# Patient Record
Sex: FEMALE | Race: WHITE | NOT HISPANIC OR LATINO | URBAN - METROPOLITAN AREA
[De-identification: names, ages, dates, MRNs, and addresses within clinical notes are randomized per-mention and may not be internally consistent; named-entity substitution may affect disease eponyms.]

---

## 2019-12-02 ENCOUNTER — FOLLOW UP (OUTPATIENT)
Dept: URBAN - METROPOLITAN AREA CLINIC 79 | Facility: CLINIC | Age: 69
End: 2019-12-02

## 2019-12-02 DIAGNOSIS — H40.019: ICD-10-CM

## 2019-12-02 PROCEDURE — 92012 INTRM OPH EXAM EST PATIENT: CPT

## 2019-12-02 PROCEDURE — 92133 CPTRZD OPH DX IMG PST SGM ON: CPT

## 2019-12-02 ASSESSMENT — VISUAL ACUITY
OD_CC: 20/25
OS_CC: 20/30

## 2019-12-02 ASSESSMENT — TONOMETRY: OD_IOP_MMHG: 12

## 2020-09-02 ENCOUNTER — ESTABLISHED COMPREHENSIVE EXAM (OUTPATIENT)
Dept: URBAN - METROPOLITAN AREA CLINIC 79 | Facility: CLINIC | Age: 70
End: 2020-09-02

## 2020-09-02 VITALS — HEIGHT: 55 IN

## 2020-09-02 DIAGNOSIS — H40.013: ICD-10-CM

## 2020-09-02 DIAGNOSIS — H52.12: ICD-10-CM

## 2020-09-02 PROCEDURE — 92250 FUNDUS PHOTOGRAPHY W/I&R: CPT

## 2020-09-02 PROCEDURE — 92014 COMPRE OPH EXAM EST PT 1/>: CPT

## 2020-09-02 PROCEDURE — 92015 DETERMINE REFRACTIVE STATE: CPT

## 2020-09-02 ASSESSMENT — VISUAL ACUITY
OS_CC: J1+
OD_CC: J1
OD_SC: 20/40
OS_CC: 20/20
OS_SC: 20/30-2
OD_CC: 20/20

## 2020-09-02 ASSESSMENT — TONOMETRY
OS_IOP_MMHG: 12
OD_IOP_MMHG: 14

## 2021-03-19 ENCOUNTER — IOP CHECK (OUTPATIENT)
Dept: URBAN - METROPOLITAN AREA CLINIC 79 | Facility: CLINIC | Age: 71
End: 2021-03-19

## 2021-03-19 VITALS — HEIGHT: 55 IN

## 2021-03-19 DIAGNOSIS — H40.013: ICD-10-CM

## 2021-03-19 PROCEDURE — 92083 EXTENDED VISUAL FIELD XM: CPT

## 2021-03-19 PROCEDURE — 92012 INTRM OPH EXAM EST PATIENT: CPT

## 2021-03-19 ASSESSMENT — TONOMETRY
OD_IOP_MMHG: 13
OS_IOP_MMHG: 14

## 2021-03-19 ASSESSMENT — VISUAL ACUITY
OS_CC: 20/25+2
OD_CC: 20/25+1

## 2021-10-19 ENCOUNTER — ESTABLISHED COMPREHENSIVE EXAM (OUTPATIENT)
Dept: URBAN - METROPOLITAN AREA CLINIC 79 | Facility: CLINIC | Age: 71
End: 2021-10-19

## 2021-10-19 DIAGNOSIS — H40.013: ICD-10-CM

## 2021-10-19 DIAGNOSIS — H35.373: ICD-10-CM

## 2021-10-19 DIAGNOSIS — Z96.1: ICD-10-CM

## 2021-10-19 DIAGNOSIS — H52.12: ICD-10-CM

## 2021-10-19 PROCEDURE — 92015 DETERMINE REFRACTIVE STATE: CPT

## 2021-10-19 PROCEDURE — 92134 CPTRZ OPH DX IMG PST SGM RTA: CPT | Mod: NC

## 2021-10-19 PROCEDURE — 92014 COMPRE OPH EXAM EST PT 1/>: CPT

## 2021-10-19 PROCEDURE — 92133 CPTRZD OPH DX IMG PST SGM ON: CPT

## 2021-10-19 ASSESSMENT — VISUAL ACUITY
OD_CC: 20/30+1
OS_CC: 20/25
OS_SC: 20/70
OD_CC: 20/25
OS_CC: 20/25
OD_SC: 20/50-1

## 2021-10-19 ASSESSMENT — TONOMETRY
OS_IOP_MMHG: 12
OD_IOP_MMHG: 13

## 2022-03-29 ENCOUNTER — IOP CHECK (OUTPATIENT)
Dept: URBAN - METROPOLITAN AREA CLINIC 79 | Facility: CLINIC | Age: 72
End: 2022-03-29

## 2022-03-29 DIAGNOSIS — Z96.1: ICD-10-CM

## 2022-03-29 DIAGNOSIS — H35.373: ICD-10-CM

## 2022-03-29 DIAGNOSIS — H40.013: ICD-10-CM

## 2022-03-29 PROCEDURE — 92134 CPTRZ OPH DX IMG PST SGM RTA: CPT

## 2022-03-29 PROCEDURE — 99213 OFFICE O/P EST LOW 20 MIN: CPT

## 2022-03-29 PROCEDURE — 92083 EXTENDED VISUAL FIELD XM: CPT

## 2022-03-29 ASSESSMENT — TONOMETRY
OS_IOP_MMHG: 10
OD_IOP_MMHG: 09

## 2022-03-29 ASSESSMENT — VISUAL ACUITY
OS_CC: 20/20
OD_CC: 20/25-1

## 2023-11-30 ENCOUNTER — HOSPITAL ENCOUNTER (OUTPATIENT)
Facility: HOSPITAL | Age: 73
Setting detail: RECURRING SERIES
End: 2023-11-30
Payer: MEDICARE

## 2023-11-30 PROCEDURE — 97110 THERAPEUTIC EXERCISES: CPT

## 2023-11-30 PROCEDURE — 97162 PT EVAL MOD COMPLEX 30 MIN: CPT

## 2023-11-30 NOTE — PROGRESS NOTES
PHYSICAL / OCCUPATIONAL THERAPY - DAILY TREATMENT NOTE (updated )    Patient Name: Demetri Daly    Date: 2023    : 1950  Insurance: Payor: Zara Senior / Plan: Jarrell Charm PPO / Product Type: Medicare /      Patient  verified Yes     Visit #   Current / Total 1 16   Time   In / Out 1:42 2:25   Pain   In / Out 7/10 7/10   Subjective Functional Status/Changes: See Eval/POC. TREATMENT AREA =  Pain in left knee [M25.562]    OBJECTIVE    33 min [x]Eval - untimed                      Therapeutic Procedures: Tx Min Billable or 1:1 Min (if diff from Tx Min) Procedure, Rationale, Specifics   10 10 [id number] Therapeutic Exercise (timed):  increase ROM, strength, coordination, balance, and proprioception to improve patient's ability to progress to PLOF and address remaining functional goals. (see flow sheet as applicable)     Details if applicable:  Patient instructed in and briefly performed beginning HEP. Handouts issued with pictures and written directions for HEP; copies placed in chart. Details if applicable:            Details if applicable:            Details if applicable:            Details if applicable:     10 10 [facility] Totals Reminder: bill using total billable min of TIMED therapeutic procedures (example: do not include dry needle or estim unattended, both untimed codes, in totals to left)  8-22 min = 1 unit; 23-37 min = 2 units; 38-52 min = 3 units; 53-67 min = 4 units; 68-82 min = 5 units   Total Total     [x]  Patient Education billed concurrently with other procedures   [x] Review HEP    [] Progressed/Changed HEP, detail:    [] Other detail:       Objective Information/Functional Measures/Assessment    See Eval/POC.     Patient will continue to benefit from skilled PT / OT services to modify and progress therapeutic interventions, analyze and address functional mobility deficits, analyze and address ROM deficits, analyze and address strength deficits, analyze

## 2023-11-30 NOTE — THERAPY EVALUATION
JUAN RAMON    Payor: Lauri Harris / Plan: Ji Garsia PPO / Product Type: Medicare /     Physician signature required for Medicare, Medicaid, Worker's Comp, Direct Access   ===================================================================  I certify that the above Therapy Services are being furnished while the patient is under my care. I agree with the treatment plan and certify that this therapy is necessary. Physician's Signature:_________________________   DATE:_________   TIME:________                           Mariel Tipton DO    ** Signature, Date and Time must be completed for valid certification **  [facility].  Thank you

## 2023-12-05 ENCOUNTER — HOSPITAL ENCOUNTER (OUTPATIENT)
Facility: HOSPITAL | Age: 73
Setting detail: RECURRING SERIES
Discharge: HOME | End: 2023-12-08
Payer: MEDICARE

## 2023-12-05 PROCEDURE — 97110 THERAPEUTIC EXERCISES: CPT

## 2023-12-05 PROCEDURE — 97530 THERAPEUTIC ACTIVITIES: CPT

## 2023-12-05 NOTE — PROGRESS NOTES
PHYSICAL / OCCUPATIONAL THERAPY - DAILY TREATMENT NOTE (updated )    Patient Name: Trevon Padilla    Date: 2023    : 1950  Insurance: Payor: Castillo Cavanaugh / Plan: [employer] [place] PPO / Product Type: Medicare /      Patient  verified Yes     Visit #   Current / Total 2 16   Time   In / Out 10:21 11:05   Pain   In / Out 4/10 4/10   Subjective Functional Status/Changes: No new falls or stumbling. OK with HEP. Knee pain mild currently. TREATMENT AREA =  Pain in left knee [M25.562]    OBJECTIVE    Therapeutic Procedures: Tx Min Billable or 1:1 Min (if diff from Tx Min) Procedure, Rationale, Specifics   36 36 [id number] Therapeutic Exercise (timed):  increase ROM, strength, coordination, balance, and proprioception to improve patient's ability to progress to PLOF and address remaining functional goals. (see flow sheet as applicable)     Details if applicable:       8 8 [id number] Therapeutic Activity (timed):  use of dynamic activities replicating functional movements to increase ROM, strength, coordination, balance, and proprioception in order to improve patient's ability to progress to PLOF and address remaining functional goals. (see flow sheet as applicable)     Details if applicable:     0 0 [id number] Neuromuscular Re-Education (timed):  improve balance, coordination, kinesthetic sense, posture, core stability and proprioception to improve patient's ability to develop conscious control of individual muscles and awareness of position of extremities in order to progress to PLOF and address remaining functional goals.  (see flow sheet as applicable)     Details if applicable:            Details if applicable:            Details if applicable:     40 44 [facility] Totals Reminder: bill using total billable min of TIMED therapeutic procedures (example: do not include dry needle or estim unattended, both untimed codes, in totals to left)  8-22 min = 1 unit; 23-37 min = 2 units; 38-52 min = 3 units;

## 2023-12-08 ENCOUNTER — HOSPITAL ENCOUNTER (OUTPATIENT)
Facility: HOSPITAL | Age: 73
Setting detail: RECURRING SERIES
Discharge: HOME | End: 2023-12-11
Payer: MEDICARE

## 2023-12-08 PROCEDURE — 97110 THERAPEUTIC EXERCISES: CPT

## 2023-12-08 NOTE — PROGRESS NOTES
12/18/2023  2:20 PM [address] [place] [address] PT REDMILL 1 [facility] [address] [place] [address]   12/20/2023  1:40 PM Will Mcburney, PT [facility] [address] [place] [address]   12/26/2023  3:00 PM Will Mcburney, PT [facility] [address] [place] [address]   12/28/2023 12:20 PM Will Mcburney, PT [address]   1/2/2024 11:40 AM Will Mcburney, PT [facility] [address] [place] [address]   1/4/2024 11:40 AM Will Mcburney, PT [facility] [address] [place] [address]   1/9/2024 11:40 AM Will Mcburney, PT [facility] [address] [place] [address]   1/11/2024 11:40 AM Will Mcburney, PT [facility] [address] [place] [address]   1/16/2024 11:40 AM Will Mcburney, PT [facility] [address] [place] [address]   1/18/2024 11:40 AM Will Mcburney, PT [facility] [address] [place] [address]   1/23/2024 11:00 AM Will Mcburney, PT [facility] [address] [place] [address]   1/25/2024 11:40 AM Will Mcburney, PT [facility] [address] [place] [address]

## 2023-12-11 ENCOUNTER — HOSPITAL ENCOUNTER (OUTPATIENT)
Facility: HOSPITAL | Age: 73
Setting detail: RECURRING SERIES
Discharge: HOME | End: 2023-12-14
Payer: MEDICARE

## 2023-12-11 PROCEDURE — 97110 THERAPEUTIC EXERCISES: CPT

## 2023-12-11 PROCEDURE — 97140 MANUAL THERAPY 1/> REGIONS: CPT

## 2023-12-11 PROCEDURE — 97112 NEUROMUSCULAR REEDUCATION: CPT

## 2023-12-11 NOTE — PROGRESS NOTES
1/23/2024 11:00 AM Rica Tate, [facility]   1/25/2024 11:40 AM AMADOR Fine [facility]

## 2023-12-13 ENCOUNTER — HOSPITAL ENCOUNTER (OUTPATIENT)
Facility: HOSPITAL | Age: 73
Setting detail: RECURRING SERIES
Discharge: HOME | End: 2023-12-16
Payer: MEDICARE

## 2023-12-13 PROCEDURE — 97110 THERAPEUTIC EXERCISES: CPT

## 2023-12-13 PROCEDURE — 97140 MANUAL THERAPY 1/> REGIONS: CPT

## 2023-12-13 NOTE — PROGRESS NOTES
will continue to benefit from skilled PT / OT services to modify and progress therapeutic interventions, analyze and address functional mobility deficits, analyze and address ROM deficits, analyze and address strength deficits, analyze and address soft tissue restrictions, analyze and cue for proper movement patterns, and instruct in home and community integration to address functional deficits and attain remaining goals. Progress toward goals / Updated goals:  []  See Progress Note/Recertification  Short Term Goals: To be accomplished in 3-4 weeks  Patient independent and compliant with progressive HEP/Self-Care Routine. Status at IE:  No HEP/Initiated. No problems reported, one new exercise added. Decrease max pain scale rating to </= 2-3/10. Status at IE:  ranges from 4 to 10/10. pain ranges during session 5-6/10 -slow progress -12-13-23. Increase left HS flexibility by >/= 10-15 deg. Status at IE:  about -45 to -50 deg. modified hamstring stretch to sitting for improved tolerance -12-13-23  Increase SLS balance to >/= 5 seconds left and right. Status at IE:  unable. Not addressed this visit. Current: 1/2 stance 15 seconds, 12/11/23     Long Term Goals: To be accomplished in 6-8 weeks  Improve FOTO score to >/= 51. Status at IE:  28.              Reassess at 30 days/PN. 2. Increase Left Hip and Knee MMT scores to >/= 4+ to 5-/5 throughout and without discomfort for testing. Status at IE:  Hip Flex 4+ to 5-/5 and OK; Hip ER 5-/5 and OK; Hip IR 4 to 4+/5 with hip discomfort; Knee Flex 4+/5 and OK; Knee Ext 4 to 4+/5 with anterior discomfort. Tolerated knee strengthening activities with modifications to decrease resistance and/or motion, all non-weightbearing today. 3. Increase ability for heel raises in SLS to >/= 4 to 4+/5 bilat.                Status at IE:

## 2023-12-26 ENCOUNTER — HOSPITAL ENCOUNTER (OUTPATIENT)
Facility: HOSPITAL | Age: 73
Setting detail: RECURRING SERIES
Discharge: HOME | End: 2023-12-29
Payer: MEDICARE

## 2023-12-26 PROCEDURE — 97110 THERAPEUTIC EXERCISES: CPT

## 2023-12-26 PROCEDURE — 97112 NEUROMUSCULAR REEDUCATION: CPT

## 2023-12-26 PROCEDURE — 97530 THERAPEUTIC ACTIVITIES: CPT

## 2023-12-26 NOTE — PROGRESS NOTES
PHYSICAL / OCCUPATIONAL THERAPY - DAILY TREATMENT NOTE (updated )    Patient Name: Ministerio Al    Date: 2023    : 1950  Insurance: Payor: Andria Miller / Plan:  PPO / Product Type: Medicare /      Patient  verified Yes     Visit #   Current / Total 8 16   Time   In / Out 3:02 3:46   Pain   In / Out 4/10 6/10   Subjective Functional Status/Changes: Pt c/o left >> right foot pain, constant ache left lower leg up to knee. Knee not too bad currently; Ok after last PT session. TREATMENT AREA =  Pain in left knee [M25.562]    OBJECTIVE     Therapeutic Procedures: Tx Min Billable or 1:1 Min (if diff from Tx Min) Procedure, Rationale, Specifics   26  12627 Therapeutic Exercise (timed):  increase ROM, strength, coordination, balance, and proprioception to improve patient's ability to progress to PLOF and address remaining functional goals. (see flow sheet as applicable)     Details if applicable:       10 8 45651 Therapeutic Activity (timed):  use of dynamic activities replicating functional movements to increase ROM, strength, coordination, balance, and proprioception in order to improve patient's ability to progress to PLOF and address remaining functional goals. (see flow sheet as applicable)     Details if applicable:     8 8 49436 Neuromuscular Re-Education (timed):  improve balance, coordination, kinesthetic sense, posture, core stability and proprioception to improve patient's ability to develop conscious control of individual muscles and awareness of position of extremities in order to progress to PLOF and address remaining functional goals.  (see flow sheet as applicable)     Details if applicable:            Details if applicable:            Details if applicable:     40 42 [facility] Totals Reminder: bill using total billable min of TIMED therapeutic procedures (example: do not include dry needle or estim unattended, both untimed codes, in totals to

## 2023-12-28 ENCOUNTER — HOSPITAL ENCOUNTER (OUTPATIENT)
Facility: HOSPITAL | Age: 73
Setting detail: RECURRING SERIES
Discharge: HOME | End: 2023-12-31
Payer: MEDICARE

## 2023-12-28 PROCEDURE — 97112 NEUROMUSCULAR REEDUCATION: CPT

## 2023-12-28 PROCEDURE — 97110 THERAPEUTIC EXERCISES: CPT

## 2023-12-28 PROCEDURE — 97530 THERAPEUTIC ACTIVITIES: CPT

## 2023-12-28 NOTE — PROGRESS NOTES
[facility]  [address], [address], [place] [address] Ph: [phone] Fx: [phone]  PHYSICAL THERAPY PROGRESS NOTE  Patient Name: Carmenza Lewis : 1950   Treatment/Medical Diagnosis: Pain in left knee [M25.562]   Referral Source: Kody Mares DO     Date of Initial Visit: 2023 Attended Visits: 9 Missed Visits: 0     SUMMARY OF TREATMENT:    Patient seen in PT for Initial Evaluation and for 8 additional follow-up treatment sessions, 23 to 23.  PT treatments consisting of Therapeutic Exercise, Therapeutic Activity, Neuromuscular Re-Education, Manual Therapy, HEP/Self-Care Instruction; also post-Tx cold pack application prn.  Patient continues to c/o left knee and leg pain, some of which (leg and foot) is neuropathic pain.    CURRENT STATUS:    FOTO:  36 (+1).  LE Flexibility:  Hamstrings about -30 deg right and about -40 deg left.  Heel Raises in SLS:  3-/5 left and 4- to 4/5 right--both with decreased knee control and c/o foot discomfort.  SLS Balance:  ~4 seconds right and ~3 seconds left--unsteady right < left.  Palpation:  Very tender left medial knee, less so lateral knee and mildly so distal anterior thigh/quads: OK patella, posterior knee and HS tendons, proximal calf, anterior knee joint line areas.  LE MMT (mid range isometric hold, seated):  Hip Flex 5- to 5/5 left, 5/5 right and OK bilat; Hip ER 3+ to 4-/5 left with sharp medial knee pain, 5- to 5/5 left with some medial knee strain; Hip IR 5/5 and OK bilat; Hip Abduction (supine) 4- to 4/5 left and 4 to 4+/5 right, OK bilat; Hip Adduction (supine) WFL and OK bilat; Hip Extension (hooklying) 4+ to 5-/5 left, 5- to 5/5 right, OK bilat.  Sit <==> Stand x5:  19.8 seconds and some hand use.    Functional Gains: none  Functional Deficits: pain and decreased ability to walk, get up from chair, get in/out of car, stand longer periods; tenderness and continued left 
4-/5 left.              No Change--3-/5 left and 4- to 4/5 right--both with decreased knee control and c/o foot discomfort.  4. Pt able to perform sit <==> stand x 5 reps in >/= 15 seconds with no/minimal hand use.               Status at IE:  slowed, uses hands to push up on chair arm rests and using right LE > left, left foot somewhat forward compared to right, no c/o increased pain to arise or to sit.              Progressing--19.8 seconds and some hand use from standard chair with armrests.     Next PN/ RC due 01/26/2023  Auth due NA.    PLAN  - Continue Plan of Care  - Upgrade activities as tolerated    Graham Nunes PT    12/28/2023    8:00 AM    Future Appointments   Date Time Provider Department Center   12/28/2023 12:20 PM Graham Nunes PT [facility]   1/2/2024 11:40 AM Graham Nunes PT [facility]   1/4/2024 11:40 AM Graham Nunes PT [facility]   1/9/2024 11:40 AM Graham Nunes, PT [facility]   1/11/2024 11:40 AM Graham Nunes PT [facility]   1/16/2024 11:40 AM Graham Nunes PT [facility]   1/18/2024 11:40 AM Graham Nunes, PT [facility]   1/23/2024 11:00 AM Graham Nunes PT [facility]   1/25/2024 11:40 AM Graham Nunes PT [facility]

## 2024-01-02 ENCOUNTER — HOSPITAL ENCOUNTER (OUTPATIENT)
Facility: HOSPITAL | Age: 74
Setting detail: RECURRING SERIES
Discharge: HOME | End: 2024-01-05
Payer: MEDICARE

## 2024-01-02 PROCEDURE — 97530 THERAPEUTIC ACTIVITIES: CPT

## 2024-01-02 PROCEDURE — 97035 APP MDLTY 1+ULTRASOUND EA 15: CPT

## 2024-01-02 PROCEDURE — 97110 THERAPEUTIC EXERCISES: CPT

## 2024-01-02 NOTE — PROGRESS NOTES
PHYSICAL / OCCUPATIONAL THERAPY - DAILY TREATMENT NOTE (updated )    Patient Name: Carmenza Lewis    Date: 2024    : 1950  Insurance: Payor: MARIA DOLORESJASON MEDICARE / Plan: [employer]ET[employer] MEDICARE-ADVANTAGE PPO / Product Type: Medicare /      Patient  verified Yes     Visit #   Current / Total 10 16   Time   In / Out 11:48 12:28   Pain   In / Out -5/10 4/10   Subjective Functional Status/Changes: Pt reports left knee felling \"squishy\" today; maybe a little swelling present.  Pt continues to have shooting pains (neuropathy) left leg and foot.     TREATMENT AREA =  Pain in left knee [M25.562]    OBJECTIVE    Modalities Rationale:     decrease inflammation, decrease pain, and increase tissue extensibility to improve patient's ability to progress to PLOF and address remaining functional goals.     min [] Estim Unattended, type/location:                                      []  w/ice    []  w/heat    min [] Estim Attended, type/location:                                     []  w/US     []  w/ice    []  w/heat    []  TENS insruct      min []  Mechanical Traction: type/lbs                   []  pro   []  sup   []  int   []  cont    []  before manual    []  after manual   8 min [x]  Ultrasound, settings/location:  3 MHz 100% @ 1.0 W/cm2 to left anteromedial knee area.    min  unbill []  Ice     []  Heat    location/position:     min []  Paraffin,  details:     min []  Vasopneumatic Device, press/temp:     min []  Whirlpool / Fluido:    If using vaso (only need to measure limb vaso being performed on)      pre-treatment girth :       post-treatment girth :       measured at (landmark location) :      min []  Other:    Skin assessment post-treatment:   Intact      Therapeutic Procedures:    Tx Min Billable or 1:1 Min (if diff from Tx Min) Procedure, Rationale, Specifics   10 59 [id number] Therapeutic Exercise (timed):  increase ROM, strength, coordination, balance, and proprioception to improve patient's ability to progress

## 2024-01-04 ENCOUNTER — APPOINTMENT (OUTPATIENT)
Facility: HOSPITAL | Age: 74
End: 2024-01-04
Payer: MEDICARE

## 2024-01-09 ENCOUNTER — HOSPITAL ENCOUNTER (OUTPATIENT)
Facility: HOSPITAL | Age: 74
Setting detail: RECURRING SERIES
End: 2024-01-09
Payer: MEDICARE

## 2024-01-11 ENCOUNTER — HOSPITAL ENCOUNTER (OUTPATIENT)
Facility: HOSPITAL | Age: 74
Setting detail: RECURRING SERIES
Discharge: HOME | End: 2024-01-14
Payer: MEDICARE

## 2024-01-11 PROCEDURE — G0283 ELEC STIM OTHER THAN WOUND: HCPCS

## 2024-01-11 PROCEDURE — 97035 APP MDLTY 1+ULTRASOUND EA 15: CPT

## 2024-01-11 PROCEDURE — 97110 THERAPEUTIC EXERCISES: CPT

## 2024-01-11 NOTE — PROGRESS NOTES
PHYSICAL / OCCUPATIONAL THERAPY - DAILY TREATMENT NOTE    Patient Name: Carmenza Lewis    Date: 2024    : 1950  Insurance: Payor: AETJASON MEDICARE / Plan: AETNA MEDICARE-ADVANTAGE PPO / Product Type: Medicare /      Patient  verified Yes     Visit #   Current / Total 11 16   Time   In / Out 11:40 12:28   Pain   In / Out 2-3/10 2-3/10   Subjective Functional Status/Changes: Pt reports left knee pain a little less lately due to resting while recovering from recent COVID infection--no fever, still a little congestion, first positive test 24, cleared by MD.     TREATMENT AREA =  Pain in left knee [M25.562]    OBJECTIVE    Modalities Rationale:     decrease edema, decrease inflammation, decrease pain, and increase tissue extensibility to improve patient's ability to progress to PLOF and address remaining functional goals.    10 min [x] Estim Unattended, type/location: IFC  Hz to left posterior knee area with concurrent MHP to same and CP to anterior and med/lat knee post-Tx in supine with bolster.                                    [x]  w/ice    [x]  w/heat    min [] Estim Attended, type/location:                                     []  w/US     []  w/ice    []  w/heat    []  TENS insruct      min []  Mechanical Traction: type/lbs                   []  pro   []  sup   []  int   []  cont    []  before manual    []  after manual   8 min [x]  Ultrasound, settings/location:  1.5 W/cm2 100% @ 1 MHZ to left popliteal area in right sidelying.   10 min  unbill [x]  Ice     [x]  Heat    location/position: MHP to posterior left knee and cold pack to anterior and med/lat left knee, both post-tx and concurrent with ES in supine with bolster.    min []  Paraffin,  details:     min []  Vasopneumatic Device, press/temp:     min []  Whirlpool / Fluido:    If using vaso (only need to measure limb vaso being performed on)      pre-treatment girth :       post-treatment girth :       measured at (landmark

## 2024-01-16 ENCOUNTER — HOSPITAL ENCOUNTER (OUTPATIENT)
Facility: HOSPITAL | Age: 74
Setting detail: RECURRING SERIES
Discharge: HOME | End: 2024-01-19
Payer: MEDICARE

## 2024-01-16 PROCEDURE — 97530 THERAPEUTIC ACTIVITIES: CPT

## 2024-01-16 PROCEDURE — G0283 ELEC STIM OTHER THAN WOUND: HCPCS

## 2024-01-16 PROCEDURE — 97110 THERAPEUTIC EXERCISES: CPT

## 2024-01-16 NOTE — PROGRESS NOTES
PHYSICAL / OCCUPATIONAL THERAPY - DAILY TREATMENT NOTE    Patient Name: Carmenza Lewis    Date: 2024    : 1950  Insurance: Payor: [employer] / Plan: AETNA MEDICARE-ADVANTAGE PPO / Product Type: Medicare /      Patient  verified Yes     Visit #   Current / Total 12 16   Time   In / Out 11:55 12:45   Pain   In / Out 3/10 1-2/10   Subjective Functional Status/Changes: Pt reports being over COVID.  Left knee not too bad currently and not so much posterior discomfort.  Pt does c/o increased body pain generally, especially lower back and other joints, likely due to colder and wetter weather.     TREATMENT AREA =  Pain in left knee [M25.562]    OBJECTIVE    Modalities Rationale:     decrease edema, decrease inflammation, decrease pain, and increase tissue extensibility to improve patient's ability to progress to PLOF and address remaining functional goals.                12 min [x] Estim Unattended, type/location: IFC  Hz to left posterior knee area with concurrent MHP to same and CP to anterior and med/lat knee post-Tx in supine with bolster.                                    [x]  w/ice    [x]  w/heat     min [] Estim Attended, type/location:                                                []  w/US     []  w/ice    []  w/heat    []  TENS insruct       min []  Mechanical Traction: type/lbs                    []  pro   []  sup   []  int   []  cont    []  before manual    []  after manual   0 min []  Ultrasound, settings/location:  1.5 W/cm2 100% @ 1 MHZ to left popliteal area in right sidelying.   12 min  unbill [x]  Ice     [x]  Heat    location/position: MHP to posterior left knee and cold pack to anterior and med/lat left knee, both post-tx and concurrent with ES in supine with bolster.     min []  Paraffin,  details:       min []  Vasopneumatic Device, press/temp:       min []  Whirlpool / Fluido:     If using vaso (only need to measure limb vaso being performed on)      pre-treatment girth

## 2024-01-18 ENCOUNTER — APPOINTMENT (OUTPATIENT)
Facility: HOSPITAL | Age: 74
End: 2024-01-18
Payer: MEDICARE

## 2024-01-18 ENCOUNTER — TELEPHONE (OUTPATIENT)
Facility: HOSPITAL | Age: 74
End: 2024-01-18

## 2024-01-23 ENCOUNTER — HOSPITAL ENCOUNTER (OUTPATIENT)
Facility: HOSPITAL | Age: 74
Setting detail: RECURRING SERIES
Discharge: HOME | End: 2024-01-26
Payer: MEDICARE

## 2024-01-23 PROCEDURE — 97110 THERAPEUTIC EXERCISES: CPT

## 2024-01-23 PROCEDURE — 97530 THERAPEUTIC ACTIVITIES: CPT

## 2024-01-23 PROCEDURE — 97112 NEUROMUSCULAR REEDUCATION: CPT

## 2024-01-23 NOTE — PROGRESS NOTES
PHYSICAL / OCCUPATIONAL THERAPY - DAILY TREATMENT NOTE    Patient Name: Carmenza Lewis    Date: 2024    : 1950  Insurance: Payor: AET[employer] MEDICARE / Plan: AETNA MEDICARE-ADVANTAGE PPO / Product Type: Medicare /      Patient  verified Yes     Visit #   Current / Total 13 16   Time   In / Out 11:01 11:44   Pain   In / Out 2/10 2-3/10   Subjective Functional Status/Changes: Pt reports bilat thigh muscle soreness next day after last PT session, but better since then.     TREATMENT AREA =  Pain in left knee [M25.562]    OBJECTIVE    Modalities Rationale:     decrease edema, decrease inflammation, decrease pain, and increase tissue extensibility to improve patient's ability to progress to PLOF and address remaining functional goals.                         0 min [] Estim Unattended, type/location: IFC  Hz to left posterior knee area with concurrent MHP to same and CP to anterior and med/lat knee post-Tx in supine with bolster.                                    [x]  w/ice    []  w/heat     min [] Estim Attended, type/location:                                                []  w/US     []  w/ice    []  w/heat    []  TENS insruct       min []  Mechanical Traction: type/lbs                    []  pro   []  sup   []  int   []  cont    []  before manual    []  after manual   0 min []  Ultrasound, settings/location:  1.5 W/cm2 100% @ 1 MHZ to left popliteal area in right sidelying.   0 min  unbill []  Ice     []  Heat    location/position: MHP to posterior left knee and cold pack to anterior and med/lat left knee, both post-tx and concurrent with ES in supine with bolster.     min []  Paraffin,  details:       min []  Vasopneumatic Device, press/temp:       min []  Whirlpool / Fluido:     If using vaso (only need to measure limb vaso being performed on)      pre-treatment girth :       post-treatment girth :       measured at (landmark location) :       min []  Other:     Skin assessment post-treatment:

## 2024-01-25 ENCOUNTER — HOSPITAL ENCOUNTER (OUTPATIENT)
Facility: HOSPITAL | Age: 74
Setting detail: RECURRING SERIES
Discharge: HOME | End: 2024-01-28
Payer: MEDICARE

## 2024-01-25 PROCEDURE — 97530 THERAPEUTIC ACTIVITIES: CPT

## 2024-01-25 PROCEDURE — 97110 THERAPEUTIC EXERCISES: CPT

## 2024-01-25 NOTE — PROGRESS NOTES
PHYSICAL / OCCUPATIONAL THERAPY - DAILY TREATMENT NOTE    Patient Name: Carmenza Lewis    Date: 2024    : 1950  Insurance: Payor: A[employer] MEDICARE / Plan: AETNA MEDICARE-ADVANTAGE PPO / Product Type: Medicare /      Patient  verified Yes     Visit #   Current / Total 14 16   Time   In / Out 11:40 12:20   Pain   In / Out 3/10 3/10   Subjective Functional Status/Changes: Pt reports difficulty/discomfort with getting into/out of the car.  Occasional sharper pains left foot/lateral knee/lateral thigh--maybe neuropathy pain.     TREATMENT AREA =  Pain in left knee [M25.562]    OBJECTIVE    Modalities Rationale:     decrease edema, decrease inflammation, decrease pain, and increase tissue extensibility to improve patient's ability to progress to PLOF and address remaining functional goals.                         0 min [] Estim Unattended, type/location: IFC  Hz to left posterior knee area with concurrent MHP to same and CP to anterior and med/lat knee post-Tx in supine with bolster.                                    []  w/ice    []  w/heat     min [] Estim Attended, type/location:                                                []  w/US     []  w/ice    []  w/heat    []  TENS insruct       min []  Mechanical Traction: type/lbs                    []  pro   []  sup   []  int   []  cont    []  before manual    []  after manual   0 min []  Ultrasound, settings/location:  1.5 W/cm2 100% @ 1 MHZ to left popliteal area in right sidelying.   0 min  unbill []  Ice     []  Heat    location/position: MHP to posterior left knee and cold pack to anterior and med/lat left knee, both post-tx and concurrent with ES in supine with bolster.     min []  Paraffin,  details:       min []  Vasopneumatic Device, press/temp:       min []  Whirlpool / Fluido:     If using vaso (only need to measure limb vaso being performed on)      pre-treatment girth :       post-treatment girth :       measured at (landmark location) :

## 2024-01-25 NOTE — PROGRESS NOTES
CHERELLE RENDON [facility]  [address], [address], [place] [address] Ph: [phone] Fx: [phone]  PHYSICAL THERAPY PROGRESS NOTE  Patient Name: Carmenza Lewis : 1950   Treatment/Medical Diagnosis: Pain in left knee [M25.562]   Referral Source: Kody Mares DO     Date of Initial Visit: 2024 Attended Visits: 14 Missed Visits: 1     SUMMARY OF TREATMENT:    Patient seen in PT for Initial Evaluation and for 13 additional follow-up treatment sessions, 23 to 24. PT treatments consisting of Therapeutic Exercise, Therapeutic Activity, Neuromuscular Re-Education, Manual Therapy, HEP/Self-Care Instruction; also post-Tx cold pack application prn. Patient continues to c/o left knee and leg pain, some of which (leg and foot) is neuropathic pain.     CURRENT STATUS:    FOTO:  54  Hamstrings about -30 deg bilat.  HR in SLS:  < 2+ to 3-/5 left and right.  SLS balance ~3 seconds left and </= 1 second right.  Sit <==> stand x5 (chair):  30 seconds, one rep without UE use.  MMT (seated):  Hip Flex 5-/5 OK; Hip Horizontal ABD 5/5 OK bilat; Hip Horizontal ADD 5/5 OK bilat; Hip ER 5-/5 OK; Hip IR 5/5 OK; Hip Ext WFL and OK bilat; Knee Flex 4+/5 and \"stress\"; Knee Ext 5-/5 OK.    Functional Gains: more streength  Functional Deficits: poor sleep; pain with on feet longer, especially standing still, in/out car, up/kurt from/to sitting.  % improvement: 10-15%  Pain   Average: 310       Best: 10     Worst: 6/10  Patient Goal: \"to be able to do things without being so slow and careful/think about it, decrease pain\"     Short Term Goals: To be accomplished in 3-4 weeks  Patient independent and compliant with progressive HEP/Self-Care Routine.               Status at PN:  Progressing--reports compliance.  Status at IE:  No HEP/Initiated.              Pt reports compliance  Decrease max pain scale rating to </= 2-3/10.               Status at PN:  Not

## 2024-01-29 ENCOUNTER — HOSPITAL ENCOUNTER (OUTPATIENT)
Facility: HOSPITAL | Age: 74
Setting detail: RECURRING SERIES
Discharge: HOME | End: 2024-02-01
Payer: MEDICARE

## 2024-01-29 PROCEDURE — 97110 THERAPEUTIC EXERCISES: CPT

## 2024-01-29 NOTE — PROGRESS NOTES
PHYSICAL / OCCUPATIONAL THERAPY - DAILY TREATMENT NOTE    Patient Name: Carmenza Lewis    Date: 2024    : 1950  Insurance: Payor: [employer] / Plan: [employer] MEDICARE-ADVANTAGE PPO / Product Type: Medicare /      Patient  verified Yes     Visit #   Current / Total 15 25   Time   In / Out 10:22 11:02   Pain   In / Out 1/10- 1/10   Subjective Functional Status/Changes: Pt reports mostly neuropathy pain in feet/legs rather than knee pain.     TREATMENT AREA =  Pain in left knee [M25.562]    OBJECTIVE    Modalities Rationale:     decrease edema, decrease inflammation, decrease pain, and increase tissue extensibility to improve patient's ability to progress to PLOF and address remaining functional goals.                         0 min [] Estim Unattended, type/location: IFC  Hz to left posterior knee area with concurrent MHP to same and CP to anterior and med/lat knee post-Tx in supine with bolster.                                    []  w/ice    []  w/heat     min [] Estim Attended, type/location:                                                []  w/US     []  w/ice    []  w/heat    []  TENS insruct       min []  Mechanical Traction: type/lbs                    []  pro   []  sup   []  int   []  cont    []  before manual    []  after manual   0 min []  Ultrasound, settings/location:  1.5 W/cm2 100% @ 1 MHZ to left popliteal area in right sidelying.   0 min  unbill []  Ice     []  Heat    location/position: MHP to posterior left knee and cold pack to anterior and med/lat left knee, both post-tx and concurrent with ES in supine with bolster.     min []  Paraffin,  details:       min []  Vasopneumatic Device, press/temp:       min []  Whirlpool / Fluido:     If using vaso (only need to measure limb vaso being performed on)      pre-treatment girth :       post-treatment girth :       measured at (landmark location) :       min []  Other:     Skin assessment post-treatment:   Intact      Therapeutic

## 2024-02-02 ENCOUNTER — HOSPITAL ENCOUNTER (OUTPATIENT)
Facility: HOSPITAL | Age: 74
Setting detail: RECURRING SERIES
Discharge: HOME | End: 2024-02-05
Payer: MEDICARE

## 2024-02-02 PROCEDURE — 97112 NEUROMUSCULAR REEDUCATION: CPT

## 2024-02-02 PROCEDURE — 97110 THERAPEUTIC EXERCISES: CPT

## 2024-02-02 NOTE — PROGRESS NOTES
procedures (example: do not include dry needle or estim unattended, both untimed codes, in totals to left)  8-22 min = 1 unit; 23-37 min = 2 units; 38-52 min = 3 units; 53-67 min = 4 units; 68-82 min = 5 units   Total Total     [x]  Patient Education billed concurrently with other procedures   [x] Review HEP    [] Progressed/Changed HEP, detail:    [] Other detail:       Objective Information/Functional Measures/Assessment    Performed well in PT clinic with all exercises--no c/o pain or legs giving way/knee buckling, but fatigued with exercises.     Patient will continue to benefit from skilled PT / OT services to modify and progress therapeutic interventions, analyze and address functional mobility deficits, analyze and address ROM deficits, analyze and address strength deficits, analyze and address soft tissue restrictions, analyze and cue for proper movement patterns, analyze and modify for postural abnormalities, analyze and address imbalance/dizziness, and instruct in home and community integration to address functional deficits and attain remaining goals.     Progress toward goals / Updated goals:  []  See Progress Note/Recertification     Short Term Goals: To be accomplished in 3-4 weeks  Patient independent and compliant with progressive HEP/Self-Care Routine.               Status at PN:  Progressing--reports compliance.  Status at IE:  No HEP/Initiated.              Pt reports compliance.  Decrease max pain scale rating to </= 2-3/10.               Status at PN:  up to about 6/10.  Status at IE:  ranges from 4 to 10/10.              0-1/10 today.  Increase left HS flexibility by >/= 10-15 deg.               Status at PN:  increased to about -30 deg bilat.  Status at IE:  about -45 to -50 deg.  Slowly improving.  Increase SLS balance to >/= 5 seconds left and right.              Status at PN:  Progressing--about 4 seconds right and 3 seconds left, unsteady for both.  Status at IE:  unable.              Not

## 2024-02-06 ENCOUNTER — HOSPITAL ENCOUNTER (OUTPATIENT)
Facility: HOSPITAL | Age: 74
Setting detail: RECURRING SERIES
Discharge: HOME | End: 2024-02-09
Payer: MEDICARE

## 2024-02-06 PROCEDURE — 97530 THERAPEUTIC ACTIVITIES: CPT

## 2024-02-06 PROCEDURE — 97110 THERAPEUTIC EXERCISES: CPT

## 2024-02-06 PROCEDURE — 97112 NEUROMUSCULAR REEDUCATION: CPT

## 2024-02-06 NOTE — PROGRESS NOTES
PHYSICAL / OCCUPATIONAL THERAPY - DAILY TREATMENT NOTE    Patient Name: Carmenza Lewis    Date: 2024    : 1950  Insurance: Payor: [employer] / Plan: A[employer] MEDICARE-ADVANTAGE PPO / Product Type: Medicare /      Patient  verified Yes     Visit #   Current / Total 17 25   Time   In / Out 11:00 11:50   Pain   In / Out 2/10 2/10   Subjective Functional Status/Changes: Pt reports that she recently saw pain management MD and has new PT orders for treatment related to her feet--pain and gait/balance.  Pt relates that her left knee is feeling \"a little squishy\" today, so she is wearing knee sleeve brace.     TREATMENT AREA =  Pain in left knee [M25.562]    OBJECTIVE    Therapeutic Procedures:    Tx Min Billable or 1:1 Min (if diff from Tx Min) Procedure, Rationale, Specifics   30 28 49054 Therapeutic Exercise (timed):  increase ROM, strength, coordination, balance, and proprioception to improve patient's ability to progress to PLOF and address remaining functional goals. (see flow sheet as applicable)     Details if applicable:       10 10 05168 Therapeutic Activity (timed):  use of dynamic activities replicating functional movements to increase ROM, strength, coordination, balance, and proprioception in order to improve patient's ability to progress to PLOF and address remaining functional goals.  (see flow sheet as applicable)     Details if applicable:     10 10 86017 Neuromuscular Re-Education (timed):  improve balance, coordination, kinesthetic sense, posture, core stability and proprioception to improve patient's ability to develop conscious control of individual muscles and awareness of position of extremities in order to progress to PLOF and address remaining functional goals. (see flow sheet as applicable)     Details if applicable:            Details if applicable:            Details if applicable:     50 48 [facility] Totals Reminder: bill using total billable min of TIMED therapeutic procedures 
4

## 2024-02-08 ENCOUNTER — HOSPITAL ENCOUNTER (OUTPATIENT)
Facility: HOSPITAL | Age: 74
Setting detail: RECURRING SERIES
Discharge: HOME | End: 2024-02-11
Payer: MEDICARE

## 2024-02-08 PROCEDURE — 97110 THERAPEUTIC EXERCISES: CPT

## 2024-02-08 PROCEDURE — 97112 NEUROMUSCULAR REEDUCATION: CPT

## 2024-02-08 PROCEDURE — 97535 SELF CARE MNGMENT TRAINING: CPT

## 2024-02-08 NOTE — THERAPY DISCHARGE
CHERELLE RENDON [facility]  [address], [address], [place] [address] Ph: [phone] Fx: [phone]  DISCHARGE SUMMARY FOR PHYSICAL THERAPY          Patient Name: Carmenza Lewis : 1950   Treatment/Medical Diagnosis: Pain in left knee [M25.562]   Onset Date: 2023    Referral Source: Kody Mares DO Start of Care (SOC): 2023   Prior Hospitalization: See Medical History Provider #: [id number]   Prior Level of Function: Independent ADLs, home chores, community mobility, sleep with chronic left knee pain and bilateral foot neuropathy.    Comorbidities: Arthritis, Back Pain, GI Disease; H/O bilat RC Repair Surgeries; H/O Right TSR.    Visits from SOC: 18 Missed Visits: 1     Key Functional Changes/Progress:   Performed well in PT clinic with all exercises--no c/o pain or legs giving way/knee buckling, but fatigued with exercises.  Did better with balance activities.  Good understanding of HEP--given green T-band loop for home use.  Pt will return for PT related to bilat distal LE neuropathy and able to have follow up instruction in knee HEP/Self-Care.     Progress toward goals / Updated goals:  []  See Progress Note/Recertification     Short Term Goals: To be accomplished in 3-4 weeks  Patient independent and compliant with progressive HEP/Self-Care Routine.               Status at PN:  Progressing--reports compliance.  Status at IE:  No HEP/Initiated.              Pt reports compliance.  Decrease max pain scale rating to </= 2-3/10.               Status at PN:  up to about 6/10.  Status at IE:  ranges from 4 to 10/10.              2-3/10 today--muscle soreness from last PT session.  Increase left HS flexibility by >/= 10-15 deg.               Status at PN:  increased to about -30 deg bilat.  Status at IE:  about -45 to -50 deg.  Not addressed this visit.  Increase SLS balance to >/= 5 seconds left and right.              Status at PN:

## 2024-02-08 NOTE — PROGRESS NOTES
and 3 seconds left, unsteady for both.  Status at IE:  unable.              Doing better at half stance balancing, in/out of tandem stance, and placing foot on/off 6\" step (toe tap).     Long Term Goals: To be accomplished in 6-8 weeks  Improve FOTO score to >/= 51.               Status at PN: Increased to 54..   Status at IE:  35.              Not Reassessed.  2. Increase Left Hip and Knee MMT scores to >/= 4+ to 5-/5 throughout and without discomfort for testing.               Status at PN:   Hip Flex 5-/5 OK; Hip Horizontal ABD 5/5 OK bilat; Hip Horizontal ADD 5/5 OK bilat; Hip ER 5-/5 OK; Hip IR 5/5 OK; Hip Ext WFL and OK bilat; Knee Flex 4+/5 and \"stress\"; Knee Ext 5-/5 OK.  Status at IE:  Hip Flex 4+ to 5-/5 and OK; Hip ER 5-/5 and OK; Hip IR 4 to 4+/5 with hip discomfort; Knee Flex 4+/5 and OK; Knee Ext 4 to 4+/5 with anterior discomfort.                   Did well with all strengthening exercises and without c/o increased pain.  3. Increase ability for heel raises in SLS to >/= 4 to 4+/5 bilat.               Status at PN:  </= 2+ to 3-/5 left and right.  Status at IE:  with UE support, 4- to 4/5 right and 3+ to 4-/5 left.              Very slow progress doing bilat HR's.            4. Pt able to perform sit <==> stand x 5 reps in >/= 15 seconds with no/minimal hand use.              Status at PN:   30 seconds from/to chair, without hands for one (4th) rep. Status at IE:  slowed, uses hands to push up on chair arm rests and using right LE > left, left foot somewhat forward compared to right, no c/o increased pain to arise or to sit.              Good control and with light UE from chair, but not quickly.     Next PN/ RC due 02/23/2024  Auth due NA    PLAN  DC to HEP and MD follow-up care, but pt to return with RX for PT to work on distal LE neuropathy.  Graham Nunes, PT    2/8/2024    8:32 AM    Future Appointments   Date Time Provider Department Center   2/8/2024 11:00 AM Graham Nunes, PT [facility]

## 2024-02-13 ENCOUNTER — HOSPITAL ENCOUNTER (OUTPATIENT)
Facility: HOSPITAL | Age: 74
Setting detail: RECURRING SERIES
Discharge: HOME | End: 2024-02-16
Payer: MEDICARE

## 2024-02-13 PROCEDURE — 97162 PT EVAL MOD COMPLEX 30 MIN: CPT

## 2024-02-13 NOTE — THERAPY EVALUATION
[facility]LE [facility]  [address], [address], [place] [address] Ph: [phone] Fx: [phone]  Plan of Care / Statement of Necessity for Physical Therapy Services     Patient Name: Carmenza Lewis : 1950   Medical   Diagnosis: Unsteadiness on feet [R26.81] Treatment Diagnosis:  M25.571  RIGHT ANKLE PAIN, M25.572  LEFT ANKLE PAIN, M79.671  RIGHT FOOT PAIN, and M79.672  LEFT FOOT PAIN     Onset Date: ~2022     Referral Source: Mana Garcia APRN - NP Start of Care (SOC): 2024   Prior Hospitalization: See medical history Provider #: [id number]   Prior Level of Function: Independent ADLs, home chores, community mobility, sleep with chronic left knee pain and bilateral foot neuropathy.    Comorbidities: Arthritis (osteo) Back Pain, GI Disease/GERD; Hyperlipidemia; Neuropathy; H/O bilat RC Repair Surgeries; H/O Right TSR; H/O Left Revision Left TKR 22 due to loosening of original prosthesis.     Assessment / key information:  Patient is a 73 year old female referred to PT by her Nurse Practitioner (2024) with RX for \"idiopathic peripheral neuropathy bilateral LE\" and orders for \"gait/balance/proprioception/desensitization protocols, modalities prn\".  Patient recently seen for PT in this office for left knee pain due to history of revision left TKR (22) and then incident of tripping and landing on her knees on hard floor in 2023 (18 visits, 23 to 2024).  Today in PT, patient reports gradual progressive onset of symptoms beginning ~2022 without known precipitating event/circumstances.  Pt initially jony some discomfort bilateral plantar first toes after getting new shoes and thought it was the shoes at first.  Lately, location varies--can be plantar foot and at other times whole foot and can be above ankles into distal legs.  Symptoms are more later in the day generally, but otherwise no pattern.  Advil helps some,

## 2024-02-13 NOTE — PROGRESS NOTES
PHYSICAL / OCCUPATIONAL THERAPY - DAILY TREATMENT NOTE    Patient Name: Carmenza Lewis    Date: 2024    : 1950  Insurance: Payor: [employer] / Plan: [employer] MEDICARE-ADVANTAGE PPO / Product Type: Medicare /      Patient  verified Yes     Visit #   Current / Total 1 16   Time   In / Out 1:40 2:27   Pain   In / Out 4/10 4/10   Subjective Functional Status/Changes: See Eval/POC.     TREATMENT AREA =  Unsteadiness on feet [R26.81]    OBJECTIVE    47 min [x]Eval - untimed                      Therapeutic Procedures:    Tx Min Billable or 1:1 Min (if diff from Tx Min) Procedure, Rationale, Specifics   0 0 47514 Therapeutic Exercise (timed):  increase ROM, strength, coordination, balance, and proprioception to improve patient's ability to progress to PLOF and address remaining functional goals. (see flow sheet as applicable)     Details if applicable:     0 0 57633 Self Care/Home Management (timed):  improve patient knowledge and understanding of pain reducing techniques, positioning, posture/ergonomics, home safety, activity modification, diagnosis/prognosis, and physical therapy expectations, procedures and progression  to improve patient's ability to progress to PLOF and address remaining functional goals.  (see flow sheet as applicable)     Details if applicable:            Details if applicable:            Details if applicable:            Details if applicable:     0 0 [facility] Totals Reminder: bill using total billable min of TIMED therapeutic procedures (example: do not include dry needle or estim unattended, both untimed codes, in totals to left)  8-22 min = 1 unit; 23-37 min = 2 units; 38-52 min = 3 units; 53-67 min = 4 units; 68-82 min = 5 units   Total Total     [x]  Patient Education billed concurrently with other procedures   [x] Review HEP    [] Progressed/Changed HEP, detail:    [] Other detail:       Objective Information/Functional Measures/Assessment    See Eval/POC.    Patient will

## 2024-02-19 ENCOUNTER — HOSPITAL ENCOUNTER (OUTPATIENT)
Facility: HOSPITAL | Age: 74
Setting detail: RECURRING SERIES
Discharge: HOME | End: 2024-02-22
Payer: MEDICARE

## 2024-02-19 PROCEDURE — 97112 NEUROMUSCULAR REEDUCATION: CPT

## 2024-02-19 PROCEDURE — 97110 THERAPEUTIC EXERCISES: CPT

## 2024-02-19 NOTE — PROGRESS NOTES
PHYSICAL / OCCUPATIONAL THERAPY - DAILY TREATMENT NOTE    Patient Name: Carmenza Lewis    Date: 2024    : 1950  Insurance: Payor: [employer] / Plan: [employer] MEDICARE-ADVANTAGE PPO / Product Type: Medicare /      Patient  verified Yes     Visit #   Current / Total 2 16   Time   In / Out 2:26 3:07   Pain   In / Out 3-10 2-3/10   Subjective Functional Status/Changes: Pt reports doing well with knee HEP.  Feet not too bad currently, but had increased discomfort after evaluation at last visit.     TREATMENT AREA =  Unsteadiness on feet [R26.81]    OBJECTIVE    Modalities Rationale:     decrease edema, decrease inflammation, decrease pain, and increase tissue extensibility to improve patient's ability to progress to PLOF and address remaining functional goals.     min [] Estim Unattended, type/location:                                      []  w/ice    []  w/heat    min [] Estim Attended, type/location:                                     []  w/US     []  w/ice    []  w/heat    []  TENS insruct      min []  Mechanical Traction: type/lbs                   []  pro   []  sup   []  int   []  cont    []  before manual    []  after manual    min []  Ultrasound, settings/location:      min  unbill []  Ice     []  Heat    location/position:     min []  Paraffin,  details:     min []  Vasopneumatic Device, press/temp:     min []  Whirlpool / Fluido:    If using vaso (only need to measure limb vaso being performed on)      pre-treatment girth :       post-treatment girth :       measured at (landmark location) :      min []  Other:    Skin assessment post-treatment:   Intact      Therapeutic Procedures:    Tx Min Billable or 1:1 Min (if diff from Tx Min) Procedure, Rationale, Specifics   81 72 [id number] Therapeutic Exercise (timed):  increase ROM, strength, coordination, balance, and proprioception to improve patient's ability to progress to PLOF and address remaining functional goals. (see flow sheet as

## 2024-02-23 ENCOUNTER — TELEPHONE (OUTPATIENT)
Facility: HOSPITAL | Age: 74
End: 2024-02-23

## 2024-02-23 ENCOUNTER — APPOINTMENT (OUTPATIENT)
Facility: HOSPITAL | Age: 74
End: 2024-02-23
Payer: MEDICARE

## 2024-02-26 ENCOUNTER — HOSPITAL ENCOUNTER (OUTPATIENT)
Facility: HOSPITAL | Age: 74
Setting detail: RECURRING SERIES
Discharge: HOME | End: 2024-02-29
Payer: MEDICARE

## 2024-02-26 PROCEDURE — 97530 THERAPEUTIC ACTIVITIES: CPT

## 2024-02-26 PROCEDURE — 97112 NEUROMUSCULAR REEDUCATION: CPT

## 2024-02-26 PROCEDURE — 97110 THERAPEUTIC EXERCISES: CPT

## 2024-02-26 NOTE — PROGRESS NOTES
increase ROM, strength, coordination, balance, and proprioception to improve patient's ability to progress to PLOF and address remaining functional goals. (see flow sheet as applicable)     Details if applicable:       10  67341 Therapeutic Activity (timed):  use of dynamic activities replicating functional movements to increase ROM, strength, coordination, balance, and proprioception in order to improve patient's ability to progress to PLOF and address remaining functional goals.  (see flow sheet as applicable)     Details if applicable:     15  59097 Neuromuscular Re-Education (timed):  improve balance, coordination, kinesthetic sense, posture, core stability and proprioception to improve patient's ability to develop conscious control of individual muscles and awareness of position of extremities in order to progress to PLOF and address remaining functional goals. (see flow sheet as applicable)     Details if applicable:       46334 Self Care/Home Management (timed):  improve patient knowledge and understanding of pain reducing techniques, positioning, posture/ergonomics, home safety, activity modification, diagnosis/prognosis, and physical therapy expectations, procedures and progression  to improve patient's ability to progress to PLOF and address remaining functional goals.  (see flow sheet as applicable)     Details if applicable:            Details if applicable:     40  [facility] Totals Reminder: bill using total billable min of TIMED therapeutic procedures (example: do not include dry needle or estim unattended, both untimed codes, in totals to left)  8-22 min = 1 unit; 23-37 min = 2 units; 38-52 min = 3 units; 53-67 min = 4 units; 68-82 min = 5 units   Total Total     [x]  Patient Education billed concurrently with other procedures   [x] Review HEP    [] Progressed/Changed HEP, detail:    [x] Other detail:   provided GTB for ankle 4way at home    Objective Information/Functional Measures/Assessment  Pt

## 2024-02-29 ENCOUNTER — HOSPITAL ENCOUNTER (OUTPATIENT)
Facility: HOSPITAL | Age: 74
Setting detail: RECURRING SERIES
End: 2024-02-29
Payer: MEDICARE

## 2024-02-29 PROCEDURE — 97112 NEUROMUSCULAR REEDUCATION: CPT

## 2024-02-29 PROCEDURE — 97140 MANUAL THERAPY 1/> REGIONS: CPT

## 2024-02-29 PROCEDURE — 97530 THERAPEUTIC ACTIVITIES: CPT

## 2024-02-29 NOTE — PROGRESS NOTES
untimed codes, in totals to left)  8-22 min = 1 unit; 23-37 min = 2 units; 38-52 min = 3 units; 53-67 min = 4 units; 68-82 min = 5 units   Total Total     [x]  Patient Education billed concurrently with other procedures   [x] Review HEP    [x] Progressed/Changed HEP, detail:  included standing balance interventions, provided pt edu for performing in safe manner   [] Other detail:     Objective Information/Functional Measures/Assessment  Session began with piriformis stretch and MT with good sx relief noted. Progressed foot intrinsic strengthening with added component of balance by performing in standing limited to 1 UE A. Pt had good tolerance to Romberg head turns, thus instructed to assume bunion stance. Pt most challenged with high knee march on foam, req incr time to complete and 1 UE A. Session concluded with MHP to R hip.     Patient will continue to benefit from skilled PT / OT services to modify and progress therapeutic interventions, analyze and address functional mobility deficits, analyze and address ROM deficits, analyze and address strength deficits, analyze and address soft tissue restrictions, analyze and cue for proper movement patterns, analyze and modify for postural abnormalities, analyze and address imbalance/dizziness, and instruct in home and community integration to address functional deficits and attain remaining goals.    Progress toward goals / Updated goals:  []  See Progress Note/Recertification    Short Term Goals: To be accomplished in 3-4 weeks  Patient Independent and compliant with progressive HEP/Self-Care Routine.               Status at IE:  No HEP/To Be Established.   2/26/24 pt notes decr compliance recently secondary to illness  Decrease max pain scale rating to </= 2/10.               Status at IE:  up to 9/10.  Increase bilat gastroc flexibility by >/= 5 deg.               Status at IE:  6 deg left and 9 deg right, both with end range stretch discomfort.  Increase bilat ankle  AROM by >/= 3 to 5 deg for DF, PF, INV, and EV.               Status at IE:  Ankle DF 12 deg and OK bilat; Ankle PF 57 deg left and 60 deg right, OK bilat; Ankle INV 38 deg and OK bilat; Ankle EV 8 deg left and 10 deg right, OK bilat.   2/26/24 addressing with resisted ankle interventions     Long Term Goals: To be accomplished in 6-8 weeks  Improve FOTO score to >/= 55.               Status at IE:  50.   Reassess at 30 days/PN.  2. Increase strength for heel raises in SLS by >/= 1/3 grade.               Status at IE:  2+ to 3-/5 left, 3-/5 right (in shoes).  2/29/24 progressing with incr in standing interventions for foot/ankle strengthening  3. Increase SLS balance to >/= 10 seconds left and right.               Status at IE:  </= 1 second left and right.  2/26/24 addressing with standing balance interventions  4. Increase Marte Balance scale rating to >/= 45; increase DGI to >/= 19; Increase FGA to >/= 24.               Status at IE:  To Be Assessed   To Be Assessed.    Next PN/ RC due 03/13/2024  Auth due (visit number/ date) 12/31/2024    PLAN  - Continue Plan of Care  - Upgrade activities as tolerated  Progress to balance activities.    Kaela Colbert PT    2/29/2024    10:01 AM    Future Appointments   Date Time Provider Department Center   2/29/2024 12:20 PM Kaela Colbert, PT [facility]   3/4/2024  1:40 PM Edmund Nelson, PTA [facility]   3/8/2024  2:20 PM Graham Nunes, PT [facility]   3/11/2024  2:20 PM Kaela Colbert, PT [facility]   3/15/2024  1:00 PM Emery Valdez, PTA [facility]   3/18/2024  2:20 PM Kaela Colbert, PT [facility]   3/21/2024  1:40 PM Kaela Colbert PT [facility]   3/25/2024  2:20 PM Kaela Colbert, PT [facility]   3/28/2024  1:40 PM Kaela Colbert, PT [facility]

## 2024-03-04 ENCOUNTER — HOSPITAL ENCOUNTER (OUTPATIENT)
Facility: HOSPITAL | Age: 74
Setting detail: RECURRING SERIES
Discharge: HOME | End: 2024-03-07
Payer: MEDICARE

## 2024-03-04 PROCEDURE — 97140 MANUAL THERAPY 1/> REGIONS: CPT

## 2024-03-04 PROCEDURE — 97110 THERAPEUTIC EXERCISES: CPT

## 2024-03-04 PROCEDURE — 97112 NEUROMUSCULAR REEDUCATION: CPT

## 2024-03-04 NOTE — PROGRESS NOTES
PHYSICAL / OCCUPATIONAL THERAPY - DAILY TREATMENT NOTE    Patient Name: Carmenza Lewis    Date: 3/4/2024    : 1950  Insurance: Payor: [employer] / Plan: [employer] MEDICARE-ADVANTAGE PPO / Product Type: Medicare /      Patient  verified Yes     Visit #   Current / Total 5 16   Time   In / Out 145 225   Pain   In / Out 3 3   Subjective Functional Status/Changes: No problems over this past weekend.     TREATMENT AREA =  Unsteadiness on feet [R26.81]    OBJECTIVE    Modalities Rationale:     decrease edema, decrease inflammation, decrease pain, and increase tissue extensibility to improve patient's ability to progress to PLOF and address remaining functional goals.     min [] Estim Unattended, type/location:                                      []  w/ice    []  w/heat    min [] Estim Attended, type/location:                                     []  w/US     []  w/ice    []  w/heat    []  TENS insruct      min []  Mechanical Traction: type/lbs                   []  pro   []  sup   []  int   []  cont    []  before manual    []  after manual    min []  Ultrasound, settings/location:      min  unbill []  Ice     []  Heat    location/position:     min []  Paraffin,  details:     min []  Vasopneumatic Device, press/temp:     min []  Whirlpool / Fluido:    If using vaso (only need to measure limb vaso being performed on)      pre-treatment girth :       post-treatment girth :       measured at (landmark location) :      min []  Other:    Skin assessment post-treatment:   Intact      Therapeutic Procedures:    Tx Min Billable or 1:1 Min (if diff from Tx Min) Procedure, Rationale, Specifics   15 15 [id number] Therapeutic Exercise (timed):  increase ROM, strength, coordination, balance, and proprioception to improve patient's ability to progress to PLOF and address remaining functional goals. (see flow sheet as applicable)     Details if applicable:         63060 Therapeutic Activity (timed):  use of dynamic activities

## 2024-03-08 ENCOUNTER — HOSPITAL ENCOUNTER (OUTPATIENT)
Facility: HOSPITAL | Age: 74
Setting detail: RECURRING SERIES
Discharge: HOME | End: 2024-03-11
Payer: MEDICARE

## 2024-03-08 PROCEDURE — 97110 THERAPEUTIC EXERCISES: CPT

## 2024-03-08 PROCEDURE — 97112 NEUROMUSCULAR REEDUCATION: CPT

## 2024-03-08 PROCEDURE — 97530 THERAPEUTIC ACTIVITIES: CPT

## 2024-03-08 NOTE — PROGRESS NOTES
number/ date) 12/31/2024.    PLAN  - Continue Plan of Care  - Upgrade activities as tolerated    Graham Nunes PT    3/8/2024    6:26 AM    Future Appointments   Date Time Provider Department Center   3/8/2024  2:20 PM Graham Nunes, PT [facility]   3/11/2024  2:20 PM Kaela Colbert, PT [facility]   3/15/2024  1:00 PM Emery Valdez, PTA [facility]   3/18/2024  2:20 PM Kaela Colbert, PT [facility]   3/21/2024  1:40 PM Kaela Colbert, PT [facility]   3/25/2024  2:20 PM Kaela Colbert, PT [facility]   3/28/2024  1:40 PM Kaela Colbert, PT [facility]

## 2024-03-11 ENCOUNTER — HOSPITAL ENCOUNTER (OUTPATIENT)
Facility: HOSPITAL | Age: 74
Setting detail: RECURRING SERIES
Discharge: HOME | End: 2024-03-14
Payer: MEDICARE

## 2024-03-11 PROCEDURE — 97112 NEUROMUSCULAR REEDUCATION: CPT

## 2024-03-11 PROCEDURE — 97530 THERAPEUTIC ACTIVITIES: CPT

## 2024-03-11 PROCEDURE — 97110 THERAPEUTIC EXERCISES: CPT

## 2024-03-11 NOTE — PROGRESS NOTES
PHYSICAL / OCCUPATIONAL THERAPY - DAILY TREATMENT NOTE    Patient Name: Carmenza Lewis    Date: 3/11/2024    : 1950  Insurance: Payor: [employer] / Plan: A[employer] MEDICARE-ADVANTAGE PPO / Product Type: Medicare /      Patient  verified Yes     Visit #   Current / Total 7 16   Time   In / Out 2:21 3:02   Pain   In / Out 3-4/10 210   Subjective Functional Status/Changes: Pt reports feet hurt the worst today (sometimes just the bottoms, sometimes up into ankle). She reports cont pain at B SIJ, R >L.     Subjective \"25%\" Improvement  Improvements: decr pain up entire leg, pain is mostly concentrated in feet although no pain in mornings only progresses during day; has gone to grocery store I; able to walk <20 mins   Deficits: numbness in feet; standing >10 mins; laundry (carrying, folding); unable to sleep through the night (interrupted d/t to foot pain); use of walker to amb to bathroom at night for safety  Pt goals: get back to traveling, is going on a cruise in Aug 2024          No recent falls      TREATMENT AREA =  Unsteadiness on feet [R26.81]    OBJECTIVE    Modalities Rationale:     decrease inflammation, decrease pain, and increase tissue extensibility to improve patient's ability to progress to PLOF and address remaining functional goals.     min [] Estim Unattended, type/location:                                      []  w/ice    []  w/heat    min [] Estim Attended, type/location:                                     []  w/US     []  w/ice    []  w/heat    []  TENS insruct      min []  Mechanical Traction: type/lbs                   []  pro   []  sup   []  int   []  cont    []  before manual    []  after manual    min []  Ultrasound, settings/location:      min  unbill []  Ice     []  Heat    location/position:     min []  Paraffin,  details:     min []  Vasopneumatic Device, press/temp:     min []  Whirlpool / Fluido:    If using vaso (only need to measure limb vaso being performed on)      
not assessed d/t time constraint       Payor: [employer] MEDICARE / Plan: AETNA MEDICARE-ADVANTAGE PPO / Product Type: Medicare /     Medicare, cannot change goals, cannot adjust frequency/duration, no signature required   Reporting Period: (date from last Prog Note/Eval to current Prog Note/Recert)  2/13/24 - 3/11/24    RECOMMENDATIONS  Continue therapy per initial Plan of Care or most recent Medicare Recert.      If you have any questions/comments please contact us directly.  Thank you for allowing us to assist in the care of your patient.    Kaela Colbert, PT       3/11/2024       3:12 PM

## 2024-03-15 ENCOUNTER — HOSPITAL ENCOUNTER (OUTPATIENT)
Facility: HOSPITAL | Age: 74
Setting detail: RECURRING SERIES
Discharge: HOME | End: 2024-03-18
Payer: MEDICARE

## 2024-03-15 PROCEDURE — 97112 NEUROMUSCULAR REEDUCATION: CPT

## 2024-03-15 PROCEDURE — 97530 THERAPEUTIC ACTIVITIES: CPT

## 2024-03-15 NOTE — PROGRESS NOTES
billable min of TIMED therapeutic procedures (example: do not include dry needle or estim unattended, both untimed codes, in totals to left)  8-22 min = 1 unit; 23-37 min = 2 units; 38-52 min = 3 units; 53-67 min = 4 units; 68-82 min = 5 units   Total Total     [x]  Patient Education billed concurrently with other procedures   [x] Review HEP    [] Progressed/Changed HEP, detail:    [] Other detail:       Objective Information/Functional Measures/Assessment    Pt performed Marte Balance Scale testing.  Also assessed TGU (10') and TGUG/Return (10' x 2).  Functional reach/balance training to place cones onto counter laterally left and right, repeated with side step/lunge and return.  Pt briefly instructed in/performed self-PROM left and right ankles in standing with partial weightbearing on each LE and hand support--limited eversion bilat.    Patient will continue to benefit from skilled PT / OT services to modify and progress therapeutic interventions, analyze and address functional mobility deficits, analyze and address ROM deficits, analyze and address strength deficits, analyze and address soft tissue restrictions, analyze and cue for proper movement patterns, analyze and modify for postural abnormalities, analyze and address imbalance/dizziness, and instruct in home and community integration to address functional deficits and attain remaining goals.    Progress toward goals / Updated goals:  []  See Progress Note/Recertification    Short Term Goals: To be accomplished in 3-4 weeks  Patient Independent and compliant with progressive HEP/Self-Care Routine.               Status at IE:  No HEP/To Be Established.               Pt reports continued compliance.  Decrease max pain scale rating to </= 2/10.               Status at IE:  up to 9/10.              2/3/10 currently  Increase bilat gastroc flexibility by >/= 5 deg.               Status at IE:  6 deg left and 9 deg right, both with end range stretch discomfort.

## 2024-03-18 ENCOUNTER — HOSPITAL ENCOUNTER (OUTPATIENT)
Facility: HOSPITAL | Age: 74
Setting detail: RECURRING SERIES
Discharge: HOME | End: 2024-03-21
Payer: MEDICARE

## 2024-03-18 PROCEDURE — 97110 THERAPEUTIC EXERCISES: CPT

## 2024-03-18 PROCEDURE — 97112 NEUROMUSCULAR REEDUCATION: CPT

## 2024-03-18 PROCEDURE — 97116 GAIT TRAINING THERAPY: CPT

## 2024-03-18 NOTE — PROGRESS NOTES
if applicable:     40  [facility] Totals Reminder: bill using total billable min of TIMED therapeutic procedures (example: do not include dry needle or estim unattended, both untimed codes, in totals to left)  8-22 min = 1 unit; 23-37 min = 2 units; 38-52 min = 3 units; 53-67 min = 4 units; 68-82 min = 5 units   Total Total     [x]  Patient Education billed concurrently with other procedures   [x] Review HEP    [] Progressed/Changed HEP, detail:    [] Other detail:       Objective Information/Functional Measures/Assessment  Pt appropriately challenged with incr in gait and balance interventions today for functional safety training. Pt able to maintain half stance and ROM EC for 30sec B, however tandem walking req 1 UE A and tandem on foam req UE A to avoid LOB after ~2-3 sec B. Pt reports incr in LBP toward end of session, pt instructed on core bracing and performed seated PPT with TA draw high marches to assist in NM control of abdominals.     Patient will continue to benefit from skilled PT / OT services to modify and progress therapeutic interventions, analyze and address functional mobility deficits, analyze and address ROM deficits, analyze and address strength deficits, analyze and address soft tissue restrictions, analyze and cue for proper movement patterns, analyze and modify for postural abnormalities, analyze and address imbalance/dizziness, and instruct in home and community integration to address functional deficits and attain remaining goals.    Progress toward goals / Updated goals:  []  See Progress Note/Recertification    Short Term Goals: To be accomplished in 3-4 weeks  Patient Independent and compliant with progressive HEP/Self-Care Routine.               Status at IE:  No HEP/To Be Established.               Pt reports continued compliance.  Decrease max pain scale rating to </= 2/10.               Status at IE:  up to 9/10.  Increase bilat gastroc flexibility by >/= 5 deg.               Status at

## 2024-03-21 ENCOUNTER — APPOINTMENT (OUTPATIENT)
Facility: HOSPITAL | Age: 74
End: 2024-03-21
Payer: MEDICARE

## 2024-03-22 ENCOUNTER — HOSPITAL ENCOUNTER (OUTPATIENT)
Facility: HOSPITAL | Age: 74
Setting detail: RECURRING SERIES
Discharge: HOME | End: 2024-03-25
Payer: MEDICARE

## 2024-03-22 PROCEDURE — 97112 NEUROMUSCULAR REEDUCATION: CPT

## 2024-03-22 PROCEDURE — 97110 THERAPEUTIC EXERCISES: CPT

## 2024-03-22 PROCEDURE — 97530 THERAPEUTIC ACTIVITIES: CPT

## 2024-03-22 NOTE — PROGRESS NOTES
PHYSICAL / OCCUPATIONAL THERAPY - DAILY TREATMENT NOTE    Patient Name: Carmenza Lewis    Date: 3/22/2024    : 1950  Insurance: Payor: [employer] / Plan: [employer] MEDICARE-ADVANTAGE PPO / Product Type: Medicare /      Patient  verified Yes     Visit #   Current / Total 10 16   Time   In / Out 11:00 11:40   Pain   In / Out 4/10 4/10   Subjective Functional Status/Changes: Pt reports falling in home 2 nights ago when getting up from the toilet,  was available to assist. Pt reports falling onto R side, did not seek medical assistance. Is currently having pain at R knee and R shoulder.   \"I was hurting a bit after last time from the balance work, I'm out of shape\"     TREATMENT AREA =  Unsteadiness on feet [R26.81]    OBJECTIVE    Therapeutic Procedures:    Tx Min Billable or 1:1 Min (if diff from Tx Min) Procedure, Rationale, Specifics   17  37205 Therapeutic Exercise (timed):  increase ROM, strength, coordination, balance, and proprioception to improve patient's ability to progress to PLOF and address remaining functional goals. (see flow sheet as applicable)     Details if applicable:     8  97728 Therapeutic Activity (timed):  use of dynamic activities replicating functional movements to increase ROM, strength, coordination, balance, and proprioception in order to improve patient's ability to progress to PLOF and address remaining functional goals.  (see flow sheet as applicable)     Details if applicable:     15  55408 Neuromuscular Re-Education (timed):  improve balance, coordination, kinesthetic sense, posture, core stability and proprioception to improve patient's ability to develop conscious control of individual muscles and awareness of position of extremities in order to progress to PLOF and address remaining functional goals. (see flow sheet as applicable)     Details if applicable:        69700 Gait Training (timed):      Details if applicable:      40' x2 of:  Head turns horizontal

## 2024-03-25 ENCOUNTER — HOSPITAL ENCOUNTER (OUTPATIENT)
Facility: HOSPITAL | Age: 74
Setting detail: RECURRING SERIES
Discharge: HOME | End: 2024-03-28
Payer: MEDICARE

## 2024-03-25 PROCEDURE — 97110 THERAPEUTIC EXERCISES: CPT

## 2024-03-25 PROCEDURE — 97530 THERAPEUTIC ACTIVITIES: CPT

## 2024-03-25 NOTE — PROGRESS NOTES
PHYSICAL / OCCUPATIONAL THERAPY - DAILY TREATMENT NOTE    Patient Name: Carmenza Lewis    Date: 3/25/2024    : 1950  Insurance: Payor: [employer] / Plan: [employer] MEDICARE-ADVANTAGE PPO / Product Type: Medicare /      Patient  verified Yes     Visit #   Current / Total 11 16   Time   In / Out 2:25 3:05   Pain   In / Out 3-4/10 3-4/10   Subjective Functional Status/Changes: Pt relates needing walker all weekend--LE's felt unsteady and some pain.     TREATMENT AREA =  Unsteadiness on feet [R26.81]    OBJECTIVE    Therapeutic Procedures:    Tx Min Billable or 1:1 Min (if diff from Tx Min) Procedure, Rationale, Specifics   32 32 [id number] Therapeutic Exercise (timed):  increase ROM, strength, coordination, balance, and proprioception to improve patient's ability to progress to PLOF and address remaining functional goals. (see flow sheet as applicable)     Details if applicable:       8 8 67754 Therapeutic Activity (timed):  use of dynamic activities replicating functional movements to increase ROM, strength, coordination, balance, and proprioception in order to improve patient's ability to progress to PLOF and address remaining functional goals.  (see flow sheet as applicable)     Details if applicable:     0 0 83859 Neuromuscular Re-Education (timed):  improve balance, coordination, kinesthetic sense, posture, core stability and proprioception to improve patient's ability to develop conscious control of individual muscles and awareness of position of extremities in order to progress to PLOF and address remaining functional goals. (see flow sheet as applicable)     Details if applicable:                 Details if applicable:     40 40 [facility] Totals Reminder: bill using total billable min of TIMED therapeutic procedures (example: do not include dry needle or estim unattended, both untimed codes, in totals to left)  8-22 min = 1 unit; 23-37 min = 2 units; 38-52 min = 3 units; 53-67 min = 4 units; 68-82 min = 5

## 2024-03-28 ENCOUNTER — HOSPITAL ENCOUNTER (OUTPATIENT)
Facility: HOSPITAL | Age: 74
Setting detail: RECURRING SERIES
Discharge: HOME | End: 2024-03-31
Payer: MEDICARE

## 2024-03-28 PROCEDURE — 97112 NEUROMUSCULAR REEDUCATION: CPT

## 2024-03-28 PROCEDURE — 97110 THERAPEUTIC EXERCISES: CPT

## 2024-03-28 PROCEDURE — 97530 THERAPEUTIC ACTIVITIES: CPT

## 2024-03-28 NOTE — PROGRESS NOTES
PHYSICAL / OCCUPATIONAL THERAPY - DAILY TREATMENT NOTE    Patient Name: Carmenza Lewis    Date: 3/28/2024    : 1950  Insurance: Payor: [employer] / Plan: [employer] MEDICARE-ADVANTAGE PPO / Product Type: Medicare /      Patient  verified Yes     Visit #   Current / Total 12 16   Time   In / Out 1:45 2:33   Pain   In / Out 0 0/10   Subjective Functional Status/Changes: \"Whatever he did last session worked, my  said he hasn't seen me walking around the house like that in a long time.\" Pt reports legs feeling stronger, no recent falls or need to use FWW. Some mild R glute pain.      TREATMENT AREA =  Unsteadiness on feet [R26.81]    OBJECTIVE    Modalities Rationale:     decrease pain to improve patient's ability to progress to PLOF and address remaining functional goals.     min [] Estim Unattended, type/location:                                      []  w/ice    []  w/heat    min [] Estim Attended, type/location:                                     []  w/US     []  w/ice    []  w/heat    []  TENS insruct      min []  Mechanical Traction: type/lbs                   []  pro   []  sup   []  int   []  cont    []  before manual    []  after manual    min []  Ultrasound, settings/location:     10 min  unbill []  Ice     [x]  Heat    location/position: L glute in supine with wedge    min []  Paraffin,  details:     min []  Vasopneumatic Device, press/temp:     min []  Whirlpool / Fluido:    If using vaso (only need to measure limb vaso being performed on)      pre-treatment girth :       post-treatment girth :       measured at (landmark location) :      min []  Other:    Skin assessment post-treatment:   Intact       Therapeutic Procedures:    Tx Min Billable or 1:1 Min (if diff from Tx Min) Procedure, Rationale, Specifics   20  91052 Therapeutic Exercise (timed):  increase ROM, strength, coordination, balance, and proprioception to improve patient's ability to progress to PLOF and address remaining

## 2024-04-02 ENCOUNTER — HOSPITAL ENCOUNTER (OUTPATIENT)
Facility: HOSPITAL | Age: 74
Setting detail: RECURRING SERIES
Discharge: HOME | End: 2024-04-05
Payer: MEDICARE

## 2024-04-02 PROCEDURE — 97140 MANUAL THERAPY 1/> REGIONS: CPT

## 2024-04-02 PROCEDURE — 97110 THERAPEUTIC EXERCISES: CPT

## 2024-04-02 PROCEDURE — 97530 THERAPEUTIC ACTIVITIES: CPT

## 2024-04-02 NOTE — PROGRESS NOTES
PHYSICAL / OCCUPATIONAL THERAPY - DAILY TREATMENT NOTE    Patient Name: Carmenza Lewis    Date: 2024    : 1950  Insurance: Payor: [employer] / Plan: [employer] MEDICARE-ADVANTAGE PPO / Product Type: Medicare /      Patient  verified Yes     Visit #   Current / Total 13 16   Time   In / Out 12:25 1:03   Pain   In / Out 3-4 3-4/10   Subjective Functional Status/Changes: Pt reported going shopping over the weekend and having LBP since then. Pt then points to R medial glute med, is TTP.      TREATMENT AREA =  Unsteadiness on feet [R26.81]    OBJECTIVE    Modalities Rationale:     decrease pain to improve patient's ability to progress to PLOF and address remaining functional goals.     min [] Estim Unattended, type/location:                                      []  w/ice    []  w/heat    min [] Estim Attended, type/location:                                     []  w/US     []  w/ice    []  w/heat    []  TENS insruct      min []  Mechanical Traction: type/lbs                   []  pro   []  sup   []  int   []  cont    []  before manual    []  after manual    min []  Ultrasound, settings/location:      min  unbill []  Ice     [x]  Heat    location/position: L glute in supine with wedge    min []  Paraffin,  details:     min []  Vasopneumatic Device, press/temp:     min []  Whirlpool / Fluido:    If using vaso (only need to measure limb vaso being performed on)      pre-treatment girth :       post-treatment girth :       measured at (landmark location) :      min []  Other:    Skin assessment post-treatment:   Intact       Therapeutic Procedures:    Tx Min Billable or 1:1 Min (if diff from Tx Min) Procedure, Rationale, Specifics   18  61832 Therapeutic Exercise (timed):  increase ROM, strength, coordination, balance, and proprioception to improve patient's ability to progress to PLOF and address remaining functional goals. (see flow sheet as applicable)     Details if applicable:       10  08460

## 2024-04-05 ENCOUNTER — HOSPITAL ENCOUNTER (OUTPATIENT)
Facility: HOSPITAL | Age: 74
Setting detail: RECURRING SERIES
Discharge: HOME | End: 2024-04-08
Payer: MEDICARE

## 2024-04-05 PROCEDURE — 97530 THERAPEUTIC ACTIVITIES: CPT

## 2024-04-05 PROCEDURE — 97110 THERAPEUTIC EXERCISES: CPT

## 2024-04-05 PROCEDURE — 97112 NEUROMUSCULAR REEDUCATION: CPT

## 2024-04-05 NOTE — PROGRESS NOTES
3way, req B UE A for balance  4. Increase Marte Balance scale rating to >/= 45; increase DGI to >/= 19; Increase FGA to >/= 24.               Status at IE:  Not Tested; 45/56 on 03/15/25.               Reassess at 30 days/PN.       Next PN/ RC due 04/09/2024.  Recert due (visit number/ date) 05/10/2024.    PLAN  - Continue Plan of Care  - Upgrade activities as tolerated    Kaela Colbert PT    4/5/2024    8:17 AM    Future Appointments   Date Time Provider Department Center   4/5/2024  1:40 PM Kaela Colbert, PT [facility]   4/9/2024 12:20 PM Kaela Colbert, PT [facility]

## 2024-04-09 ENCOUNTER — APPOINTMENT (OUTPATIENT)
Facility: HOSPITAL | Age: 74
End: 2024-04-09
Payer: MEDICARE

## 2024-04-10 ENCOUNTER — HOSPITAL ENCOUNTER (OUTPATIENT)
Facility: HOSPITAL | Age: 74
Setting detail: RECURRING SERIES
Discharge: HOME | End: 2024-04-13
Payer: MEDICARE

## 2024-04-10 PROCEDURE — 97530 THERAPEUTIC ACTIVITIES: CPT

## 2024-04-10 PROCEDURE — 97112 NEUROMUSCULAR REEDUCATION: CPT

## 2024-04-10 NOTE — PROGRESS NOTES
PHYSICAL / OCCUPATIONAL THERAPY - DAILY TREATMENT NOTE    Patient Name: Carmenza Lewis    Date: 4/10/2024    : 1950  Insurance: Payor: [employer] / Plan: [employer] MEDICARE-ADVANTAGE PPO / Product Type: Medicare /      Patient  verified Yes     Visit #   Current / Total 15 16   Time   In / Out 2:21 pm 3:23 pm   Pain   In / Out 1-2 1-2   Subjective Functional Status/Changes: Pt reports ~ 15% improvement in balance     TREATMENT AREA =  Unsteadiness on feet [R26.81]    OBJECTIVE         Therapeutic Procedures:    Tx Min Billable or 1:1 Min (if diff from Tx Min) Procedure, Rationale, Specifics   36  73216 Neuromuscular Re-Education (timed):  improve balance, coordination, kinesthetic sense, posture, core stability and proprioception to improve patient's ability to develop conscious control of individual muscles and awareness of position of extremities in order to progress to PLOF and address remaining functional goals. (see flow sheet as applicable)     Details if applicable:       26  29262 Therapeutic Activity (timed):  use of dynamic activities replicating functional movements to increase ROM, strength, coordination, balance, and proprioception in order to improve patient's ability to progress to PLOF and address remaining functional goals.  (see flow sheet as applicable)     Details if applicable:            Details if applicable:            Details if applicable:            Details if applicable:     62  [facility] Totals Reminder: bill using total billable min of TIMED therapeutic procedures (example: do not include dry needle or estim unattended, both untimed codes, in totals to left)  8-22 min = 1 unit; 23-37 min = 2 units; 38-52 min = 3 units; 53-67 min = 4 units; 68-82 min = 5 units   Total Total     [x]  Patient Education billed concurrently with other procedures   [x] Review HEP    [] Progressed/Changed HEP, detail:    [] Other detail:       Objective Information/Functional

## 2024-04-10 NOTE — PROGRESS NOTES
[facility]  [address], [address], [place] [address] Ph: [phone] Fx: [phone]  PHYSICAL THERAPY PROGRESS NOTE  Patient Name: Carmenza Lewis : 1950   Treatment/Medical Diagnosis: Unsteadiness on feet [R26.81]   Referral Source: Mana Garcia APRN - NP     Date of Initial Visit: 24 Attended Visits: 15 Missed Visits: 0     SUMMARY OF TREATMENT   Patient is a 73  year old feamle referred to PT by her doctor (2023) for Left Knee Pain, Instability, Effusion, H/O left TKR. PT treatments consisting of Therapeutic Exercise, Therapeutic Activity, Neuromuscular Re-Education, Manual Therapy, HEP/Self-Care Instruction; also post-Tx cold pack application prn.     CURRENT STATUS  Subjective :~ 15% Improvement in balance  Improvements: decreasing pain more discomfort ; walking tolerance ~ 15 min, folding laundry  Deficits: constant moderate/servere numbness in feet and into the ankles; standing tolerance ~ 10 mins; laundry (carrying); unable to sleep through the night (interrupted d/t to foot pain); use of walker to amb to bathroom at night for safety  Pt goals: get back to traveling, is going on a cruise in Aug 2024      Progress toward goals / Updated goals:      Short Term Goals: To be accomplished in 3-4 weeks  Patient Independent and compliant with progressive HEP/Self-Care Routine.               Status at IE:  No HEP/To Be Established.                          Current 3/11/24 goal met, Pt reports compliance.  Current status 4/10/24- Goal met; Pt reports continued compliance in current HEP.   Decrease max pain scale rating to </= 2/10.               Status at IE:  up to 9/10.              Current 3/11/24 goal progressing, pt reports pain of 5/10 at worst in feet, is constant   Current 4/10/24 Goal met: 0/10 pain, discomfort 1-2/10     Increase bilat gastroc flexibility by >/= 5 deg.               Status at IE:  6 deg left and 9 deg

## 2024-04-15 ENCOUNTER — HOSPITAL ENCOUNTER (OUTPATIENT)
Facility: HOSPITAL | Age: 74
Setting detail: RECURRING SERIES
Discharge: HOME | End: 2024-04-18
Payer: MEDICARE

## 2024-04-15 PROCEDURE — 97116 GAIT TRAINING THERAPY: CPT

## 2024-04-15 PROCEDURE — 97110 THERAPEUTIC EXERCISES: CPT

## 2024-04-15 PROCEDURE — 97530 THERAPEUTIC ACTIVITIES: CPT

## 2024-04-15 NOTE — PROGRESS NOTES
57 deg left and 60 deg right, OK bilat; Ankle INV 38 deg and OK bilat; Ankle EV 8 deg left and 10 deg right, OK bilat.              Current 3/11/24; goal not met; INV L: 35 def, R: 38 deg; EV L 5 deg R 9 deg; PF L 61deg, R 65 deg   Current 4/10/24 :  AROM     Long Term Goals: To be accomplished in 6-8 weeks  Improve FOTO score to >/= 55.               Status at IE:  50.              Current 3/11/24 goal remains nearly the same, 49  Current 4/10/24   2. Increase strength for heel raises in SLS by >/= 1/3 grade.               Status at IE:  2+ to 3-/5 left, 3-/5 right (in shoes).                          Current 3/11/24 goal not met,  approx 6 SLS HR B however with mod B UE A and knee flexion compensation  Current 4/10/24  Goal not met; L single HR > 25% AROM ; R single HR ~> 50% AROM both with mod B UE A and knee flexion compensation  3. Increase SLS balance to >/= 10 seconds left and right.               Status at IE:  </= 1 second left and right.                      Current 3/11/24 goal not met, approx 1\" on R, 3\" on L  Current 4/10/24:  R: ~3 seconds;  L: ~ 5 seconds  4. Increase Marte Balance scale rating to >/= 45; increase DGI to >/= 19; Increase FGA to >/= 24.               Status at IE:  To Be Assessed                          Current 3/11/24 goal not assessed d/t time constraint  Current 4/10/24: FGA:  19/30        PLAN  - Continue Plan of Care    Kaela Colbert, PT    4/15/2024    11:49 AM    Future Appointments   Date Time Provider Department Center   4/15/2024  1:00 PM Kaela Colbert, PT [facility]   4/17/2024 12:20 PM Trisha Rosario PT [facility]   4/22/2024 11:40 AM Kaela Colbert, PT [facility]   4/26/2024  2:20 PM Emery Valdez, JARED [facility]   4/29/2024  2:20 PM Emery Valdez PTA [facility]   5/2/2024  3:00 PM Emery Valdez PTA [facility]   5/6/2024  2:20 PM Emery Valdez PTA [facility]   5/9/2024  2:20 PM Emery Valdez PTA [facility]

## 2024-04-17 ENCOUNTER — HOSPITAL ENCOUNTER (OUTPATIENT)
Facility: HOSPITAL | Age: 74
Setting detail: RECURRING SERIES
Discharge: HOME | End: 2024-04-20
Payer: MEDICARE

## 2024-04-17 PROCEDURE — 97530 THERAPEUTIC ACTIVITIES: CPT | Performed by: PHYSICAL THERAPIST

## 2024-04-17 PROCEDURE — 97110 THERAPEUTIC EXERCISES: CPT | Performed by: PHYSICAL THERAPIST

## 2024-04-17 NOTE — PROGRESS NOTES
CHERELLE RENDON [facility]  [address], [address], [place] [address] Ph: [phone] Fx: [phone]  CONTINUED PLAN OF CARE/RECERTIFICATION FOR PHYSICAL THERAPY          Patient Name: Carmenza Lewis : 1950   Treatment/Medical Diagnosis: Unsteadiness on feet [R26.81]   Onset Date: 2022    Referral Source: Mana Garcia APRN - NP Start of Care (SOC): 24   Prior Hospitalization: See Medical History Provider #: [id number]   Prior Level of Function:   Independent ADLs, home chores, community mobility, sleep with chronic left knee pain and bilateral foot neuropathy.       Comorbidities: Arthritis (osteo) Back Pain, GI Disease/GERD; Hyperlipidemia; Neuropathy; H/O bilat RC Repair Surgeries; H/O Right TSR; H/O Left Revision Left TKR 22 due to loosening of original prosthesis.    Visits from SOC: 17 Missed Visits: 1     Progress to Goals:     Short Term Goals: To be accomplished in 3-4 weeks  Patient Independent and compliant with progressive HEP/Self-Care Routine.               Status at IE:  No HEP/To Be Established.                          Current 3/11/24 goal met, Pt reports compliance.  Current status 4/10/24- Goal met; Pt reports continued compliance in current HEP.   Decrease max pain scale rating to </= 2/10.               Status at IE:  up to 9/10.              Current 3/11/24 goal progressing, pt reports pain of 5/10 at worst in feet, is constant   Current 4/10/24 Goal met: 0/10 pain, discomfort 1-2/10      Increase bilat gastroc flexibility by >/= 5 deg.               Status at IE:  6 deg left and 9 deg right, both with end range stretch discomfort.              Current 3/11/24 goal remains the same, L 5 deg R 9 deg  Current 4/10/24:  L:  deg; R   deg     Increase bilat ankle AROM by >/= 3 to 5 deg for DF, PF, INV, and EV.               Status at IE:  Ankle DF 12 deg and OK bilat; Ankle PF 57 deg left and 60 deg right, OK bilat; Ankle INV 38 
Information/Functional Measures/Assessment    Treatment modified today due to patient's increased pain and decreased standing tolerance today.  Performed ankle therex seated. Pt deferred dynamic gait activities today.  Please assess DF AROM at nv.  Re-certification performed today.    Patient will continue to benefit from skilled PT / OT services to modify and progress therapeutic interventions, analyze and address functional mobility deficits, analyze and address ROM deficits, analyze and address strength deficits, analyze and address soft tissue restrictions, analyze and cue for proper movement patterns, analyze and address imbalance/dizziness, and instruct in home and community integration to address functional deficits and attain remaining goals.    Progress toward goals / Updated goals:   Short Term Goals: To be accomplished in 3-4 weeks  Patient Independent and compliant with progressive HEP/Self-Care Routine.               Status at IE:  No HEP/To Be Established.                          Current 3/11/24 goal met, Pt reports compliance.  Current status 4/10/24- Goal met; Pt reports continued compliance in current HEP.   Decrease max pain scale rating to </= 2/10.               Status at IE:  up to 9/10.              Current 3/11/24 goal progressing, pt reports pain of 5/10 at worst in feet, is constant   Current 4/10/24 Goal met: 0/10 pain, discomfort 1-2/10      Increase bilat gastroc flexibility by >/= 5 deg.               Status at IE:  6 deg left and 9 deg right, both with end range stretch discomfort.              Current 3/11/24 goal remains the same, L 5 deg R 9 deg  Current 4/10/24:  L:  deg; R   deg     Increase bilat ankle AROM by >/= 3 to 5 deg for DF, PF, INV, and EV.               Status at IE:  Ankle DF 12 deg and OK bilat; Ankle PF 57 deg left and 60 deg right, OK bilat; Ankle INV 38 deg and OK bilat; Ankle EV 8 deg left and 10 deg right, OK bilat.              Current 3/11/24; goal not met; INV

## 2024-04-22 ENCOUNTER — HOSPITAL ENCOUNTER (OUTPATIENT)
Facility: HOSPITAL | Age: 74
Setting detail: RECURRING SERIES
Discharge: HOME | End: 2024-04-25
Payer: MEDICARE

## 2024-04-22 PROCEDURE — 97112 NEUROMUSCULAR REEDUCATION: CPT

## 2024-04-22 PROCEDURE — 97110 THERAPEUTIC EXERCISES: CPT

## 2024-04-22 NOTE — PROGRESS NOTES
PHYSICAL / OCCUPATIONAL THERAPY - DAILY TREATMENT NOTE    Patient Name: Carmenza Lewis    Date: 2024    : 1950  Insurance: Payor: [employer] / Plan: [employer] MEDICARE-ADVANTAGE PPO / Product Type: Medicare /      Patient  verified Yes     Visit #   Current / Total 18 40   Time   In / Out 11:47 am 12:27 am   Pain   In / Out 5 5   Subjective Functional Status/Changes: Pt reports left knee is really bothering her. Unsteady day today.     TREATMENT AREA =  Unsteadiness on feet [R26.81]    OBJECTIVE         Therapeutic Procedures:    Tx Min Billable or 1:1 Min (if diff from Tx Min) Procedure, Rationale, Specifics   13  21932 Therapeutic Exercise (timed):  increase ROM, strength, coordination, balance, and proprioception to improve patient's ability to progress to PLOF and address remaining functional goals. (see flow sheet as applicable)     Details if applicable:       26  94083 Neuromuscular Re-Education (timed):  improve balance, coordination, kinesthetic sense, posture, core stability and proprioception to improve patient's ability to develop conscious control of individual muscles and awareness of position of extremities in order to progress to PLOF and address remaining functional goals. (see flow sheet as applicable)     Details if applicable:            Details if applicable:            Details if applicable:            Details if applicable:     39   BC Totals Reminder: bill using total billable min of TIMED therapeutic procedures (example: do not include dry needle or estim unattended, both untimed codes, in totals to left)  8-22 min = 1 unit; 23-37 min = 2 units; 38-52 min = 3 units; 53-67 min = 4 units; 68-82 min = 5 units   Total Total     [x]  Patient Education billed concurrently with other procedures   [x] Review HEP    [] Progressed/Changed HEP, detail:    [] Other detail:       Objective Information/Functional Measures/Assessment    Physical therapy session consisted of static

## 2024-04-26 ENCOUNTER — APPOINTMENT (OUTPATIENT)
Facility: HOSPITAL | Age: 74
End: 2024-04-26
Payer: MEDICARE

## 2024-04-29 ENCOUNTER — HOSPITAL ENCOUNTER (OUTPATIENT)
Facility: HOSPITAL | Age: 74
Setting detail: RECURRING SERIES
Discharge: HOME | End: 2024-05-02
Payer: MEDICARE

## 2024-04-29 PROCEDURE — 97110 THERAPEUTIC EXERCISES: CPT

## 2024-04-29 PROCEDURE — 97530 THERAPEUTIC ACTIVITIES: CPT

## 2024-04-29 NOTE — PROGRESS NOTES
Total Total     [x]  Patient Education billed concurrently with other procedures   [x] Review HEP    [] Progressed/Changed HEP, detail:    [] Other detail:       Objective Information/Functional Measures/Assessment    Physical therapy session consisted of siting LE and core strengthening in sitting and supine. Standing exercises held this session secondary to pt not feeling well recently. Recommended pt to consult MD if sx in LE persist or increase. Pt performing shortened session of exercise without increase in sx. Pt amb with 4 wheel walker for the last week. Resume standing exercises as tolerated.     Patient will continue to benefit from skilled PT / OT services to modify and progress therapeutic interventions, analyze and address functional mobility deficits, analyze and address ROM deficits, analyze and address strength deficits, analyze and address soft tissue restrictions, analyze and cue for proper movement patterns, and instruct in home and community integration to address functional deficits and attain remaining goals.    Progress toward goals / Updated goals:  []  See Progress Note/Recertification  Short Term Goals: To be accomplished in 3-4 weeks  Patient Independent and compliant with progressive HEP/Self-Care Routine.               Status at IE:  No HEP/To Be Established.                          Current 3/11/24 goal met, Pt reports compliance.     Current status 4/10/24- Goal met; Pt reports continued compliance in current HEP.   Current 4/29/24 decreased exercises per flow sheet this session.     Decrease max pain scale rating to </= 2/10.               Status at IE:  up to 9/10.              Current 3/11/24 goal progressing, pt reports pain of 5/10 at worst in feet, is constant   Current 4/10/24 Goal met: 0/10 pain, discomfort 1-2/10      Increase bilat gastroc flexibility by >/= 5 deg.               Status at IE:  6 deg left and 9 deg right, both with end range stretch discomfort.

## 2024-05-02 ENCOUNTER — APPOINTMENT (OUTPATIENT)
Facility: HOSPITAL | Age: 74
End: 2024-05-02
Payer: MEDICARE

## 2024-05-02 ENCOUNTER — TELEPHONE (OUTPATIENT)
Facility: HOSPITAL | Age: 74
End: 2024-05-02

## 2024-05-06 ENCOUNTER — HOSPITAL ENCOUNTER (OUTPATIENT)
Facility: HOSPITAL | Age: 74
Setting detail: RECURRING SERIES
Discharge: HOME | End: 2024-05-09
Payer: MEDICARE

## 2024-05-06 PROCEDURE — 97112 NEUROMUSCULAR REEDUCATION: CPT

## 2024-05-06 PROCEDURE — 97110 THERAPEUTIC EXERCISES: CPT

## 2024-05-06 NOTE — PROGRESS NOTES
PHYSICAL / OCCUPATIONAL THERAPY - DAILY TREATMENT NOTE    Patient Name: Carmenza Lewis    Date: 2024    : 1950  Insurance: Payor: [employer] / Plan: [employer] MEDICARE-ADVANTAGE PPO / Product Type: Medicare /      Patient  verified Yes     Visit #   Current / Total 20 40   Time   In / Out 2:33 pm 3:08 pm   Pain   In / Out 5-6 5-6   Subjective Functional Status/Changes: Pt reports she is still having a lot of shooting pain in [place]. Feels it still may be from the reaction she had to medication. Still peeling. Pt reports using walker all over the place vs only at night. She used SPC for [employer] and had difficulty standing through the hyms.      TREATMENT AREA =  Unsteadiness on feet [R26.81]    OBJECTIVE         Therapeutic Procedures:    Tx Min Billable or 1:1 Min (if diff from Tx Min) Procedure, Rationale, Specifics   10  23316 Therapeutic Exercise (timed):  increase ROM, strength, coordination, balance, and proprioception to improve patient's ability to progress to PLOF and address remaining functional goals. (see flow sheet as applicable)     Details if applicable:       10  22937 Therapeutic Activity (timed):  use of dynamic activities replicating functional movements to increase ROM, strength, coordination, balance, and proprioception in order to improve patient's ability to progress to PLOF and address remaining functional goals.  (see flow sheet as applicable)     Details if applicable:     15  01684 Neuromuscular Re-Education (timed):  improve balance, coordination, kinesthetic sense, posture, core stability and proprioception to improve patient's ability to develop conscious control of individual muscles and awareness of position of extremities in order to progress to PLOF and address remaining functional goals. (see flow sheet as applicable)     Details if applicable:            Details if applicable:            Details if applicable:     35   BC Totals Reminder: bill using total

## 2024-05-08 ENCOUNTER — HOSPITAL ENCOUNTER (OUTPATIENT)
Facility: HOSPITAL | Age: 74
Setting detail: RECURRING SERIES
Discharge: HOME | End: 2024-05-11
Payer: MEDICARE

## 2024-05-08 PROCEDURE — 97110 THERAPEUTIC EXERCISES: CPT | Performed by: PHYSICAL THERAPIST

## 2024-05-08 PROCEDURE — 97530 THERAPEUTIC ACTIVITIES: CPT | Performed by: PHYSICAL THERAPIST

## 2024-05-08 PROCEDURE — 97112 NEUROMUSCULAR REEDUCATION: CPT | Performed by: PHYSICAL THERAPIST

## 2024-05-08 NOTE — PROGRESS NOTES
PHYSICAL / OCCUPATIONAL THERAPY - DAILY TREATMENT NOTE    Patient Name: Carmenza Lewis    Date: 2024    : 1950  Insurance: Payor: [employer] / Plan: [employer] MEDICARE-ADVANTAGE PPO / Product Type: Medicare /      Patient  verified Yes     Visit #   Current / Total 20 40   Time   In / Out 1145 pm 1237pm   Pain   In / Out 5 6   Subjective Functional Status/Changes: Pt reports usually her knee is OK but it hurts today. The numbness is worse overall. Pt reports she can't even feel the vibrations on her nerve stimulator that she uses. Pt reports pain increased in back of L knee. Pt reports it used to just be the soles of feet were numb, but now reports the tops of feet and going up ankle now. Pt reports mod damage to L LE nerves >R per NCV test. Pt states she has been using walker vs cane for the last two weeks. Pt denies near falls. Pt reports doing balance exercises at home. Pt reports reduction in standing tolerance in shower, as she now sits. Pt reports occasional LBP but nothing significant.      TREATMENT AREA =  Unsteadiness on feet [R26.81]    OBJECTIVE    Therapeutic Procedures:    Tx Min Billable or 1:1 Min (if diff from Tx Min) Procedure, Rationale, Specifics   20  37358 Therapeutic Exercise (timed):  increase ROM, strength, coordination, balance, and proprioception to improve patient's ability to progress to PLOF and address remaining functional goals. (see flow sheet as applicable)     Details if applicable:       17  85775 Therapeutic Activity (timed):  use of dynamic activities replicating functional movements to increase ROM, strength, coordination, balance, and proprioception in order to improve patient's ability to progress to PLOF and address remaining functional goals.  (see flow sheet as applicable)     Details if applicable:     15  89975 Neuromuscular Re-Education (timed):  improve balance, coordination, kinesthetic sense, posture, core stability and proprioception to improve

## 2024-05-09 ENCOUNTER — APPOINTMENT (OUTPATIENT)
Facility: HOSPITAL | Age: 74
End: 2024-05-09
Payer: MEDICARE

## 2024-05-13 ENCOUNTER — HOSPITAL ENCOUNTER (OUTPATIENT)
Facility: HOSPITAL | Age: 74
Setting detail: RECURRING SERIES
Discharge: HOME | End: 2024-05-16
Payer: MEDICARE

## 2024-05-13 PROCEDURE — 97110 THERAPEUTIC EXERCISES: CPT

## 2024-05-13 PROCEDURE — 97112 NEUROMUSCULAR REEDUCATION: CPT

## 2024-05-13 NOTE — PROGRESS NOTES
EV.               Status at IE:  Ankle DF 12 deg and OK bilat; Ankle PF 57 deg left and 60 deg right, OK bilat; Ankle INV 38 deg and OK bilat; Ankle EV 8 deg left and 10 deg right, OK bilat.              Current 3/11/24; goal not met; INV L: 35 def, R: 38 deg; EV L 5 deg R 9 deg; PF L 61deg, R 65 deg   Current 4/10/24 : Pt continues with limitations in ankle ROM globally DF to be assessed NV, Mod tightness in L calf noted 5/8/24   Current: added limits of stability and standing ankle circles   Long Term Goals: To be accomplished in 6-8 weeks  Improve FOTO score to >/= 55.               Status at IE:  50.              Current 3/11/24 goal remains nearly the same, 49  Current 4/10/24   2. Increase strength for heel raises in SLS by >/= 1/3 grade.               Status at IE:  2+ to 3-/5 left, 3-/5 right (in shoes).                          Current 3/11/24 goal not met,  approx 6 SLS HR B however with mod B UE A and knee flexion compensation  Current 4/10/24  Goal not met; L single HR > 25% AROM ; R single HR ~> 50% AROM both with mod B UE A and knee flexion compensation  3. Increase SLS balance to >/= 10 seconds left and right.               Status at IE:  </= 1 second left and right.                      Current 3/11/24 goal not met, approx 1\" on R, 3\" on L  Current 4/10/24:  R: ~3 seconds;  L: ~ 5 seconds  4. Increase Marte Balance scale rating to >/= 45; increase DGI to >/= 19; Increase FGA to >/= 24.               Status at IE:  To Be Assessed                          Current 3/11/24 goal not assessed d/t time constraint  Current 4/10/24: FGA: 19, not met        Next PN/ RC due 5/17/24   Auth due (visit number/ date) 12/31/24    PLAN  - Continue Plan of Care    Emery Valdez PTA    5/13/2024    9:47 AM    Future Appointments   Date Time Provider Department Center   5/17/2024  1:00 PM Emery Valdez PTA [facility]

## 2024-05-17 ENCOUNTER — HOSPITAL ENCOUNTER (OUTPATIENT)
Facility: HOSPITAL | Age: 74
Setting detail: RECURRING SERIES
Discharge: HOME | End: 2024-05-20
Payer: MEDICARE

## 2024-05-17 PROCEDURE — 97530 THERAPEUTIC ACTIVITIES: CPT

## 2024-05-17 PROCEDURE — 97110 THERAPEUTIC EXERCISES: CPT

## 2024-05-17 NOTE — PROGRESS NOTES
D/C HEP.     Patient will continue to benefit from skilled PT / OT services to modify and progress therapeutic interventions, analyze and address functional mobility deficits, analyze and address ROM deficits, analyze and address imbalance/dizziness, and instruct in home and community integration to address functional deficits and attain remaining goals.    Progress toward goals / Updated goals:  []  See Progress Note/Recertification    See discharge     PLAN  - Continue Plan of Care    Emery Valdez PTA    5/17/2024    12:59 PM    Future Appointments   Date Time Provider Department Center   5/17/2024  1:00 PM Emery Valdez PTA [facility]

## 2024-05-17 NOTE — DISCHARGE SUMMARY
CHERELLE RENDON [facility]  [address], [address], [place] [address] Ph: [phone] Fx: [phone]  DISCHARGE SUMMARY  Patient Name: Carmenza Lewis : 1950   Treatment/Medical Diagnosis: Unsteadiness on feet [R26.81]   Referral Source: Mana Garcia APRN - NP     Date of Initial Visit: 2022 Attended Visits: 23 Missed Visits: 1     SUMMARY OF TREATMENT  PT treatments consisting of Therapeutic Exercise, Therapeutic Activity, Neuromuscular Re-Education, Manual Therapy, HEP/Self-Care Instruction; also post-Tx cold pack application prn.     CURRENT STATUS  Pt reports slow steady progress. Pt had consult with pain management MD.   Pt reports numbness is worse overall. Pt reports she can't  feel the vibrations on her nerve stimulator that she uses. Pt reports it used to just be the soles of feet were numb, but now reports the tops of feet and going up ankle now. Pt reports mod damage to L LE nerves >R per NCV test. Pt states she has been using walker vs cane. Pt is independent with current HEP.   GROC: +2 a little better    Short Term Goals: To be accomplished in 3-4 weeks  Patient Independent and compliant with progressive HEP/Self-Care Routine.               Status at IE:  No HEP/To Be Established.                          Current 3/11/24 goal met, Pt reports compliance.  Current status 4/10/24- Goal met; Pt reports continued compliance in current HEP.   Current: 24- Goal met. Pt independent in current D/C HEP.     Decrease max pain scale rating to </= 2/10.               Status at IE:  up to /10.              Current 3/11/24 goal progressing, pt reports pain of 5/10 at worst in feet, is constant   Current 4/10/24 Goal met: 0/10 pain, discomfort 1-2/10    Current 24 Goal met: pain: 1-2/10-left knee; 6/10 at worst in feet.  Goal met: Partially    Increase bilat gastroc flexibility by >/= 5 deg.               Status at IE:  6 deg left and 9 deg

## 2025-08-18 ENCOUNTER — HOSPITAL ENCOUNTER (OUTPATIENT)
Facility: HOSPITAL | Age: 75
Setting detail: RECURRING SERIES
Discharge: HOME | End: 2025-08-21
Payer: MEDICARE

## 2025-08-18 PROCEDURE — 97110 THERAPEUTIC EXERCISES: CPT

## 2025-08-18 PROCEDURE — 97162 PT EVAL MOD COMPLEX 30 MIN: CPT
